# Patient Record
Sex: MALE | Race: WHITE | NOT HISPANIC OR LATINO | URBAN - METROPOLITAN AREA
[De-identification: names, ages, dates, MRNs, and addresses within clinical notes are randomized per-mention and may not be internally consistent; named-entity substitution may affect disease eponyms.]

---

## 2022-07-09 ENCOUNTER — TELEPHONE ENCOUNTER (OUTPATIENT)
Dept: URBAN - METROPOLITAN AREA CLINIC 121 | Facility: CLINIC | Age: 42
End: 2022-07-09

## 2022-07-09 RX ORDER — DEXTROAMPHETAMINE SACCHARATE, AMPHETAMINE ASPARTATE, DEXTROAMPHETAMINE SULFATE, AND AMPHETAMINE SULFATE 5; 5; 5; 5 MG/1; MG/1; MG/1; MG/1
TABLET ORAL ONCE A DAY
Refills: 0 | OUTPATIENT
Start: 2013-05-10 | End: 2013-06-07

## 2022-07-10 ENCOUNTER — TELEPHONE ENCOUNTER (OUTPATIENT)
Dept: URBAN - METROPOLITAN AREA CLINIC 121 | Facility: CLINIC | Age: 42
End: 2022-07-10

## 2022-07-10 RX ORDER — ALPRAZOLAM 0.25 MG
TABLET ORAL TAKE AS DIRECTED
Refills: 0 | Status: ACTIVE | COMMUNITY
Start: 2013-05-10

## 2022-07-10 RX ORDER — BISMUTH SUBSALICYLATE 525 MG/1
TABLET ORAL ONCE A DAY
Refills: 0 | Status: ACTIVE | COMMUNITY
Start: 2013-06-07

## 2022-08-14 LAB
ANION GAP SERPL CALCULATED.3IONS-SCNC: 28 MMOL/L (ref 10–20)
BICARBONATE: 15 MMOL/L (ref 21–32)
CHLORIDE BLD-SCNC: 98 MMOL/L (ref 98–107)
CREAT SERPL-MCNC: 2.3 MG/DL (ref 0.6–1.3)
GLUCOSE: 93 MG/DL (ref 74–99)
IONIZED CA: 0.9 MMOL/L (ref 1.1–1.3)
POTASSIUM SERPL-SCNC: 4.1 MMOL/L (ref 3.5–5.3)
SODIUM BLD-SCNC: 137 MMOL/L (ref 136–145)
UREA NITROGEN: 25 MG/DL (ref 6–23)

## 2024-03-17 ENCOUNTER — APPOINTMENT (OUTPATIENT)
Dept: RADIOLOGY | Facility: HOSPITAL | Age: 44
DRG: 894 | End: 2024-03-17
Payer: COMMERCIAL

## 2024-03-17 ENCOUNTER — HOSPITAL ENCOUNTER (INPATIENT)
Facility: HOSPITAL | Age: 44
LOS: 6 days | Discharge: LEFT BEFORE DISCHARGE | DRG: 894 | End: 2024-03-23
Attending: STUDENT IN AN ORGANIZED HEALTH CARE EDUCATION/TRAINING PROGRAM | Admitting: STUDENT IN AN ORGANIZED HEALTH CARE EDUCATION/TRAINING PROGRAM
Payer: COMMERCIAL

## 2024-03-17 ENCOUNTER — APPOINTMENT (OUTPATIENT)
Dept: CARDIOLOGY | Facility: HOSPITAL | Age: 44
DRG: 894 | End: 2024-03-17
Payer: COMMERCIAL

## 2024-03-17 DIAGNOSIS — F10.932 ALCOHOL WITHDRAWAL SYNDROME WITH PERCEPTUAL DISTURBANCE (MULTI): Primary | ICD-10-CM

## 2024-03-17 LAB
ALBUMIN SERPL BCP-MCNC: 4.3 G/DL (ref 3.4–5)
ALP SERPL-CCNC: 89 U/L (ref 33–120)
ALT SERPL W P-5'-P-CCNC: 420 U/L (ref 10–52)
ANION GAP SERPL CALC-SCNC: 27 MMOL/L (ref 10–20)
ANION GAP SERPL CALC-SCNC: 29 MMOL/L (ref 10–20)
APAP SERPL-MCNC: <10 UG/ML
APPARATUS: ABNORMAL
AST SERPL W P-5'-P-CCNC: 512 U/L (ref 9–39)
B-OH-BUTYR SERPL-SCNC: 0.96 MMOL/L (ref 0.02–0.27)
BASE EXCESS BLDV CALC-SCNC: -5.4 MMOL/L (ref -2–3)
BASOPHILS # BLD AUTO: 0.05 X10*3/UL (ref 0–0.1)
BASOPHILS NFR BLD AUTO: 0.8 %
BILIRUB SERPL-MCNC: 0.9 MG/DL (ref 0–1.2)
BODY TEMPERATURE: 37 DEGREES CELSIUS
BUN SERPL-MCNC: 17 MG/DL (ref 6–23)
BUN SERPL-MCNC: 18 MG/DL (ref 6–23)
CALCIUM SERPL-MCNC: 7.6 MG/DL (ref 8.6–10.3)
CALCIUM SERPL-MCNC: 8.1 MG/DL (ref 8.6–10.3)
CHLORIDE SERPL-SCNC: 95 MMOL/L (ref 98–107)
CHLORIDE SERPL-SCNC: 96 MMOL/L (ref 98–107)
CO2 SERPL-SCNC: 15 MMOL/L (ref 21–32)
CO2 SERPL-SCNC: 15 MMOL/L (ref 21–32)
CREAT SERPL-MCNC: 0.71 MG/DL (ref 0.5–1.3)
CREAT SERPL-MCNC: 0.81 MG/DL (ref 0.5–1.3)
EGFRCR SERPLBLD CKD-EPI 2021: >90 ML/MIN/1.73M*2
EGFRCR SERPLBLD CKD-EPI 2021: >90 ML/MIN/1.73M*2
EOSINOPHIL # BLD AUTO: 0.02 X10*3/UL (ref 0–0.7)
EOSINOPHIL NFR BLD AUTO: 0.3 %
ERYTHROCYTE [DISTWIDTH] IN BLOOD BY AUTOMATED COUNT: 13.4 % (ref 11.5–14.5)
ETHANOL SERPL-MCNC: 260 MG/DL
FLUAV RNA RESP QL NAA+PROBE: NOT DETECTED
FLUBV RNA RESP QL NAA+PROBE: NOT DETECTED
FREQUENCY (BPM): 18 BPM
GLUCOSE BLD MANUAL STRIP-MCNC: 97 MG/DL (ref 74–99)
GLUCOSE SERPL-MCNC: 68 MG/DL (ref 74–99)
GLUCOSE SERPL-MCNC: 70 MG/DL (ref 74–99)
HCO3 BLDV-SCNC: 19.2 MMOL/L (ref 22–26)
HCT VFR BLD AUTO: 46.9 % (ref 41–52)
HGB BLD-MCNC: 16.3 G/DL (ref 13.5–17.5)
HOLD SPECIMEN: NORMAL
IMM GRANULOCYTES # BLD AUTO: 0.05 X10*3/UL (ref 0–0.7)
IMM GRANULOCYTES NFR BLD AUTO: 0.8 % (ref 0–0.9)
INHALED O2 CONCENTRATION: 28 %
LIPASE SERPL-CCNC: 47 U/L (ref 9–82)
LYMPHOCYTES # BLD AUTO: 1.53 X10*3/UL (ref 1.2–4.8)
LYMPHOCYTES NFR BLD AUTO: 23.6 %
MAGNESIUM SERPL-MCNC: 1.64 MG/DL (ref 1.6–2.4)
MCH RBC QN AUTO: 35.5 PG (ref 26–34)
MCHC RBC AUTO-ENTMCNC: 34.8 G/DL (ref 32–36)
MCV RBC AUTO: 102 FL (ref 80–100)
MONOCYTES # BLD AUTO: 0.41 X10*3/UL (ref 0.1–1)
MONOCYTES NFR BLD AUTO: 6.3 %
NEUTROPHILS # BLD AUTO: 4.42 X10*3/UL (ref 1.2–7.7)
NEUTROPHILS NFR BLD AUTO: 68.2 %
NRBC BLD-RTO: 0 /100 WBCS (ref 0–0)
OXYHGB MFR BLDV: 81.9 % (ref 45–75)
PCO2 BLDV: 34 MM HG (ref 41–51)
PH BLDV: 7.36 PH (ref 7.33–7.43)
PHOSPHATE SERPL-MCNC: 3.1 MG/DL (ref 2.5–4.9)
PLATELET # BLD AUTO: 236 X10*3/UL (ref 150–450)
PO2 BLDV: 53 MM HG (ref 35–45)
POTASSIUM SERPL-SCNC: 4 MMOL/L (ref 3.5–5.3)
POTASSIUM SERPL-SCNC: 4.3 MMOL/L (ref 3.5–5.3)
POTASSIUM SERPL-SCNC: 7.5 MMOL/L (ref 3.5–5.3)
PROT SERPL-MCNC: 7 G/DL (ref 6.4–8.2)
RBC # BLD AUTO: 4.59 X10*6/UL (ref 4.5–5.9)
SALICYLATES SERPL-MCNC: <3 MG/DL
SAO2 % BLDV: 85 % (ref 45–75)
SARS-COV-2 RNA RESP QL NAA+PROBE: NOT DETECTED
SODIUM SERPL-SCNC: 131 MMOL/L (ref 136–145)
SODIUM SERPL-SCNC: 134 MMOL/L (ref 136–145)
WBC # BLD AUTO: 6.5 X10*3/UL (ref 4.4–11.3)

## 2024-03-17 PROCEDURE — 84132 ASSAY OF SERUM POTASSIUM: CPT | Performed by: STUDENT IN AN ORGANIZED HEALTH CARE EDUCATION/TRAINING PROGRAM

## 2024-03-17 PROCEDURE — 99291 CRITICAL CARE FIRST HOUR: CPT | Mod: 25 | Performed by: STUDENT IN AN ORGANIZED HEALTH CARE EDUCATION/TRAINING PROGRAM

## 2024-03-17 PROCEDURE — 96374 THER/PROPH/DIAG INJ IV PUSH: CPT

## 2024-03-17 PROCEDURE — 80307 DRUG TEST PRSMV CHEM ANLYZR: CPT

## 2024-03-17 PROCEDURE — 82010 KETONE BODYS QUAN: CPT

## 2024-03-17 PROCEDURE — 85025 COMPLETE CBC W/AUTO DIFF WBC: CPT | Performed by: STUDENT IN AN ORGANIZED HEALTH CARE EDUCATION/TRAINING PROGRAM

## 2024-03-17 PROCEDURE — 2550000001 HC RX 255 CONTRASTS: Performed by: STUDENT IN AN ORGANIZED HEALTH CARE EDUCATION/TRAINING PROGRAM

## 2024-03-17 PROCEDURE — 2500000004 HC RX 250 GENERAL PHARMACY W/ HCPCS (ALT 636 FOR OP/ED): Performed by: STUDENT IN AN ORGANIZED HEALTH CARE EDUCATION/TRAINING PROGRAM

## 2024-03-17 PROCEDURE — 83605 ASSAY OF LACTIC ACID: CPT | Performed by: STUDENT IN AN ORGANIZED HEALTH CARE EDUCATION/TRAINING PROGRAM

## 2024-03-17 PROCEDURE — 84100 ASSAY OF PHOSPHORUS: CPT | Performed by: STUDENT IN AN ORGANIZED HEALTH CARE EDUCATION/TRAINING PROGRAM

## 2024-03-17 PROCEDURE — 36415 COLL VENOUS BLD VENIPUNCTURE: CPT | Performed by: STUDENT IN AN ORGANIZED HEALTH CARE EDUCATION/TRAINING PROGRAM

## 2024-03-17 PROCEDURE — 93005 ELECTROCARDIOGRAM TRACING: CPT

## 2024-03-17 PROCEDURE — 87636 SARSCOV2 & INF A&B AMP PRB: CPT | Performed by: STUDENT IN AN ORGANIZED HEALTH CARE EDUCATION/TRAINING PROGRAM

## 2024-03-17 PROCEDURE — 70450 CT HEAD/BRAIN W/O DYE: CPT

## 2024-03-17 PROCEDURE — 2500000004 HC RX 250 GENERAL PHARMACY W/ HCPCS (ALT 636 FOR OP/ED)

## 2024-03-17 PROCEDURE — 96376 TX/PRO/DX INJ SAME DRUG ADON: CPT

## 2024-03-17 PROCEDURE — 74177 CT ABD & PELVIS W/CONTRAST: CPT

## 2024-03-17 PROCEDURE — 74177 CT ABD & PELVIS W/CONTRAST: CPT | Mod: FOREIGN READ | Performed by: RADIOLOGY

## 2024-03-17 PROCEDURE — 82947 ASSAY GLUCOSE BLOOD QUANT: CPT

## 2024-03-17 PROCEDURE — 80143 DRUG ASSAY ACETAMINOPHEN: CPT | Performed by: STUDENT IN AN ORGANIZED HEALTH CARE EDUCATION/TRAINING PROGRAM

## 2024-03-17 PROCEDURE — 80053 COMPREHEN METABOLIC PANEL: CPT | Performed by: STUDENT IN AN ORGANIZED HEALTH CARE EDUCATION/TRAINING PROGRAM

## 2024-03-17 PROCEDURE — 96375 TX/PRO/DX INJ NEW DRUG ADDON: CPT

## 2024-03-17 PROCEDURE — 83690 ASSAY OF LIPASE: CPT | Performed by: STUDENT IN AN ORGANIZED HEALTH CARE EDUCATION/TRAINING PROGRAM

## 2024-03-17 PROCEDURE — 82805 BLOOD GASES W/O2 SATURATION: CPT | Performed by: STUDENT IN AN ORGANIZED HEALTH CARE EDUCATION/TRAINING PROGRAM

## 2024-03-17 PROCEDURE — 70450 CT HEAD/BRAIN W/O DYE: CPT | Performed by: RADIOLOGY

## 2024-03-17 PROCEDURE — 96361 HYDRATE IV INFUSION ADD-ON: CPT

## 2024-03-17 PROCEDURE — 80320 DRUG SCREEN QUANTALCOHOLS: CPT | Performed by: STUDENT IN AN ORGANIZED HEALTH CARE EDUCATION/TRAINING PROGRAM

## 2024-03-17 PROCEDURE — 83735 ASSAY OF MAGNESIUM: CPT | Performed by: STUDENT IN AN ORGANIZED HEALTH CARE EDUCATION/TRAINING PROGRAM

## 2024-03-17 PROCEDURE — 99291 CRITICAL CARE FIRST HOUR: CPT

## 2024-03-17 PROCEDURE — 2020000001 HC ICU ROOM DAILY

## 2024-03-17 RX ORDER — ALLOPURINOL 300 MG/1
300 TABLET ORAL DAILY
COMMUNITY
Start: 2018-05-24

## 2024-03-17 RX ORDER — PHENOBARBITAL SODIUM 65 MG/ML
260 INJECTION, SOLUTION INTRAMUSCULAR; INTRAVENOUS ONCE
Status: COMPLETED | OUTPATIENT
Start: 2024-03-17 | End: 2024-03-17

## 2024-03-17 RX ORDER — LORAZEPAM 2 MG/ML
6 INJECTION INTRAMUSCULAR ONCE
Status: COMPLETED | OUTPATIENT
Start: 2024-03-17 | End: 2024-03-17

## 2024-03-17 RX ORDER — PANTOPRAZOLE SODIUM 40 MG/10ML
40 INJECTION, POWDER, LYOPHILIZED, FOR SOLUTION INTRAVENOUS DAILY
Status: DISCONTINUED | OUTPATIENT
Start: 2024-03-18 | End: 2024-03-17

## 2024-03-17 RX ORDER — QUETIAPINE FUMARATE 100 MG/1
100 TABLET, FILM COATED ORAL NIGHTLY PRN
COMMUNITY
Start: 2018-05-24

## 2024-03-17 RX ORDER — ENOXAPARIN SODIUM 100 MG/ML
40 INJECTION SUBCUTANEOUS EVERY 24 HOURS
Status: DISCONTINUED | OUTPATIENT
Start: 2024-03-17 | End: 2024-03-23 | Stop reason: HOSPADM

## 2024-03-17 RX ORDER — ROPINIROLE 4 MG/1
4 TABLET, FILM COATED ORAL NIGHTLY PRN
COMMUNITY
Start: 2024-02-26

## 2024-03-17 RX ORDER — ALLOPURINOL 100 MG/1
300 TABLET ORAL DAILY
Status: DISCONTINUED | OUTPATIENT
Start: 2024-03-17 | End: 2024-03-23 | Stop reason: HOSPADM

## 2024-03-17 RX ORDER — PANTOPRAZOLE SODIUM 40 MG/1
40 TABLET, DELAYED RELEASE ORAL
Status: DISCONTINUED | OUTPATIENT
Start: 2024-03-18 | End: 2024-03-17

## 2024-03-17 RX ORDER — METOPROLOL TARTRATE 50 MG/1
50 TABLET ORAL 2 TIMES DAILY
Status: DISCONTINUED | OUTPATIENT
Start: 2024-03-17 | End: 2024-03-17

## 2024-03-17 RX ORDER — DIAZEPAM 5 MG/ML
10 INJECTION, SOLUTION INTRAMUSCULAR; INTRAVENOUS EVERY 2 HOUR PRN
Status: DISCONTINUED | OUTPATIENT
Start: 2024-03-17 | End: 2024-03-19

## 2024-03-17 RX ORDER — ESOMEPRAZOLE MAGNESIUM 40 MG/1
40 GRANULE, DELAYED RELEASE ORAL
Status: DISCONTINUED | OUTPATIENT
Start: 2024-03-18 | End: 2024-03-17

## 2024-03-17 RX ORDER — FOLIC ACID 1 MG/1
1 TABLET ORAL DAILY
Status: DISCONTINUED | OUTPATIENT
Start: 2024-03-17 | End: 2024-03-23 | Stop reason: HOSPADM

## 2024-03-17 RX ORDER — METOPROLOL TARTRATE 1 MG/ML
5 INJECTION, SOLUTION INTRAVENOUS EVERY 8 HOURS SCHEDULED
Status: DISCONTINUED | OUTPATIENT
Start: 2024-03-17 | End: 2024-03-17

## 2024-03-17 RX ORDER — QUETIAPINE FUMARATE 100 MG/1
100 TABLET, FILM COATED ORAL NIGHTLY PRN
Status: DISCONTINUED | OUTPATIENT
Start: 2024-03-17 | End: 2024-03-17

## 2024-03-17 RX ORDER — MULTIVIT-MIN/IRON FUM/FOLIC AC 7.5 MG-4
1 TABLET ORAL DAILY
Status: DISCONTINUED | OUTPATIENT
Start: 2024-03-17 | End: 2024-03-23 | Stop reason: HOSPADM

## 2024-03-17 RX ORDER — LANOLIN ALCOHOL/MO/W.PET/CERES
100 CREAM (GRAM) TOPICAL DAILY
Status: DISCONTINUED | OUTPATIENT
Start: 2024-03-20 | End: 2024-03-23 | Stop reason: HOSPADM

## 2024-03-17 RX ORDER — ONDANSETRON HYDROCHLORIDE 2 MG/ML
4 INJECTION, SOLUTION INTRAVENOUS ONCE
Status: COMPLETED | OUTPATIENT
Start: 2024-03-17 | End: 2024-03-17

## 2024-03-17 RX ORDER — LORAZEPAM 2 MG/ML
2 INJECTION INTRAMUSCULAR ONCE
Status: DISCONTINUED | OUTPATIENT
Start: 2024-03-17 | End: 2024-03-17

## 2024-03-17 RX ORDER — PANTOPRAZOLE SODIUM 40 MG/10ML
40 INJECTION, POWDER, LYOPHILIZED, FOR SOLUTION INTRAVENOUS DAILY
Status: DISCONTINUED | OUTPATIENT
Start: 2024-03-18 | End: 2024-03-23 | Stop reason: HOSPADM

## 2024-03-17 RX ORDER — COLCHICINE 0.6 MG/1
0.6 TABLET ORAL 2 TIMES DAILY PRN
Status: DISCONTINUED | OUTPATIENT
Start: 2024-03-17 | End: 2024-03-17

## 2024-03-17 RX ORDER — DEXMEDETOMIDINE HYDROCHLORIDE 4 UG/ML
.1-1.5 INJECTION, SOLUTION INTRAVENOUS CONTINUOUS
Status: DISCONTINUED | OUTPATIENT
Start: 2024-03-17 | End: 2024-03-20

## 2024-03-17 RX ORDER — TALC
1 POWDER (GRAM) TOPICAL NIGHTLY
COMMUNITY
Start: 2018-06-08

## 2024-03-17 RX ORDER — LORAZEPAM 2 MG/ML
4 INJECTION INTRAMUSCULAR ONCE
Status: COMPLETED | OUTPATIENT
Start: 2024-03-17 | End: 2024-03-17

## 2024-03-17 RX ORDER — LORAZEPAM 2 MG/ML
2 INJECTION INTRAMUSCULAR ONCE
Status: COMPLETED | OUTPATIENT
Start: 2024-03-17 | End: 2024-03-17

## 2024-03-17 RX ORDER — ROPINIROLE 2 MG/1
4 TABLET, FILM COATED ORAL NIGHTLY PRN
Status: DISCONTINUED | OUTPATIENT
Start: 2024-03-17 | End: 2024-03-17

## 2024-03-17 RX ORDER — METOPROLOL TARTRATE 1 MG/ML
5 INJECTION, SOLUTION INTRAVENOUS EVERY 6 HOURS PRN
Status: DISCONTINUED | OUTPATIENT
Start: 2024-03-17 | End: 2024-03-18

## 2024-03-17 RX ORDER — PHENOBARBITAL SODIUM 65 MG/ML
32.5 INJECTION, SOLUTION INTRAMUSCULAR; INTRAVENOUS EVERY 8 HOURS
Status: COMPLETED | OUTPATIENT
Start: 2024-03-21 | End: 2024-03-23

## 2024-03-17 RX ORDER — METOPROLOL TARTRATE 50 MG/1
50 TABLET ORAL 2 TIMES DAILY
COMMUNITY
Start: 2019-11-13

## 2024-03-17 RX ORDER — PHENOBARBITAL SODIUM 65 MG/ML
65 INJECTION, SOLUTION INTRAMUSCULAR; INTRAVENOUS EVERY 8 HOURS
Status: COMPLETED | OUTPATIENT
Start: 2024-03-19 | End: 2024-03-21

## 2024-03-17 RX ORDER — PHENOBARBITAL SODIUM 65 MG/ML
97.5 INJECTION, SOLUTION INTRAMUSCULAR; INTRAVENOUS EVERY 8 HOURS
Status: COMPLETED | OUTPATIENT
Start: 2024-03-17 | End: 2024-03-19

## 2024-03-17 RX ORDER — PANTOPRAZOLE SODIUM 40 MG/10ML
40 INJECTION, POWDER, LYOPHILIZED, FOR SOLUTION INTRAVENOUS
Status: DISCONTINUED | OUTPATIENT
Start: 2024-03-18 | End: 2024-03-17

## 2024-03-17 RX ORDER — COLCHICINE 0.6 MG/1
0.6 TABLET ORAL 2 TIMES DAILY PRN
COMMUNITY
Start: 2023-05-01

## 2024-03-17 RX ORDER — DEXTROSE MONOHYDRATE AND SODIUM CHLORIDE 5; .9 G/100ML; G/100ML
125 INJECTION, SOLUTION INTRAVENOUS CONTINUOUS
Status: DISCONTINUED | OUTPATIENT
Start: 2024-03-17 | End: 2024-03-19

## 2024-03-17 RX ORDER — THIAMINE HYDROCHLORIDE 100 MG/ML
100 INJECTION, SOLUTION INTRAMUSCULAR; INTRAVENOUS DAILY
Status: DISCONTINUED | OUTPATIENT
Start: 2024-03-17 | End: 2024-03-17

## 2024-03-17 RX ADMIN — LORAZEPAM 4 MG: 2 INJECTION, SOLUTION INTRAMUSCULAR; INTRAVENOUS at 11:00

## 2024-03-17 RX ADMIN — PHENOBARBITAL SODIUM 97.5 MG: 65 INJECTION INTRAMUSCULAR; INTRAVENOUS at 23:50

## 2024-03-17 RX ADMIN — DEXMEDETOMIDINE HYDROCHLORIDE 0.4 MCG/KG/HR: 4 INJECTION, SOLUTION INTRAVENOUS at 20:33

## 2024-03-17 RX ADMIN — DIAZEPAM 10 MG: 10 INJECTION, SOLUTION INTRAMUSCULAR; INTRAVENOUS at 09:35

## 2024-03-17 RX ADMIN — DEXMEDETOMIDINE HYDROCHLORIDE 0.4 MCG/KG/HR: 4 INJECTION, SOLUTION INTRAVENOUS at 17:10

## 2024-03-17 RX ADMIN — DIAZEPAM 10 MG: 10 INJECTION, SOLUTION INTRAMUSCULAR; INTRAVENOUS at 16:30

## 2024-03-17 RX ADMIN — THIAMINE HYDROCHLORIDE 100 MG: 100 INJECTION, SOLUTION INTRAMUSCULAR; INTRAVENOUS at 10:06

## 2024-03-17 RX ADMIN — LORAZEPAM 2 MG: 2 INJECTION INTRAMUSCULAR; INTRAVENOUS at 09:44

## 2024-03-17 RX ADMIN — ONDANSETRON 4 MG: 2 INJECTION INTRAMUSCULAR; INTRAVENOUS at 09:29

## 2024-03-17 RX ADMIN — DEXTROSE AND SODIUM CHLORIDE 75 ML/HR: 5; 900 INJECTION, SOLUTION INTRAVENOUS at 14:59

## 2024-03-17 RX ADMIN — DIAZEPAM 10 MG: 10 INJECTION, SOLUTION INTRAMUSCULAR; INTRAVENOUS at 13:36

## 2024-03-17 RX ADMIN — THIAMINE HYDROCHLORIDE 500 MG: 100 INJECTION, SOLUTION INTRAMUSCULAR; INTRAVENOUS at 20:43

## 2024-03-17 RX ADMIN — PHENOBARBITAL SODIUM 97.5 MG: 65 INJECTION INTRAMUSCULAR; INTRAVENOUS at 17:17

## 2024-03-17 RX ADMIN — DEXMEDETOMIDINE HYDROCHLORIDE 0.2 MCG/KG/HR: 4 INJECTION, SOLUTION INTRAVENOUS at 13:35

## 2024-03-17 RX ADMIN — DIAZEPAM 10 MG: 10 INJECTION, SOLUTION INTRAMUSCULAR; INTRAVENOUS at 20:43

## 2024-03-17 RX ADMIN — SODIUM CHLORIDE, POTASSIUM CHLORIDE, SODIUM LACTATE AND CALCIUM CHLORIDE 1000 ML: 600; 310; 30; 20 INJECTION, SOLUTION INTRAVENOUS at 12:53

## 2024-03-17 RX ADMIN — LORAZEPAM 6 MG: 2 INJECTION, SOLUTION INTRAMUSCULAR; INTRAVENOUS at 11:29

## 2024-03-17 RX ADMIN — PHENOBARBITAL SODIUM 260 MG: 65 INJECTION INTRAMUSCULAR; INTRAVENOUS at 09:29

## 2024-03-17 RX ADMIN — ENOXAPARIN SODIUM 40 MG: 40 INJECTION SUBCUTANEOUS at 16:30

## 2024-03-17 RX ADMIN — IOHEXOL 75 ML: 350 INJECTION, SOLUTION INTRAVENOUS at 12:40

## 2024-03-17 SDOH — HEALTH STABILITY: MENTAL HEALTH: BEHAVIORS/MOOD: AGITATED;ANXIOUS;ANGRY;APPEARS INTOXICATED

## 2024-03-17 ASSESSMENT — LIFESTYLE VARIABLES
VISUAL DISTURBANCES: SEVERE HALLUCINATIONS
PULSE: 117
ANXIETY: NO ANXIETY, AT EASE
TACTILE DISTURBANCES: MODERATELY SEVERE HALLUCINATIONS
ORIENTATION AND CLOUDING OF SENSORIUM: CANNOT DO SERIAL ADDITIONS OR IS UNCERTAIN ABOUT DATE
EVER HAD A DRINK FIRST THING IN THE MORNING TO STEADY YOUR NERVES TO GET RID OF A HANGOVER: YES
HEADACHE, FULLNESS IN HEAD: SEVERE
AUDITORY DISTURBANCES: NOT PRESENT
AGITATION: NORMAL ACTIVITY
HEADACHE, FULLNESS IN HEAD: MODERATE
ORIENTATION AND CLOUDING OF SENSORIUM: CANNOT DO SERIAL ADDITIONS OR IS UNCERTAIN ABOUT DATE
AUDITORY DISTURBANCES: SEVERE HALLUCINATIONS
EVER FELT BAD OR GUILTY ABOUT YOUR DRINKING: YES
VISUAL DISTURBANCES: SEVERE HALLUCINATIONS
AUDITORY DISTURBANCES: SEVERE HALLUCINATIONS
ANXIETY: EQUIVALENT TO ACUTE PANIC STATES AS SEEN IN SEVERE DELIRIUM OR ACUTE SCHIZOPHRENIC REACTIONS
AUDITORY DISTURBANCES: VERY MILD HARSHNESS OR ABILITY TO FRIGHTEN
PAROXYSMAL SWEATS: BEADS OF SWEAT OBVIOUS ON FOREHEAD
NAUSEA AND VOMITING: MILD NAUSEA WITH NO VOMITING
AGITATION: 5
ORIENTATION AND CLOUDING OF SENSORIUM: CANNOT DO SERIAL ADDITIONS OR IS UNCERTAIN ABOUT DATE
ANXIETY: 5
PULSE: 134
TOTAL SCORE: 41
AUDITORY DISTURBANCES: SEVERE HALLUCINATIONS
PULSE: 91
PAROXYSMAL SWEATS: 3
VISUAL DISTURBANCES: NOT PRESENT
AGITATION: 6
VISUAL DISTURBANCES: SEVERE HALLUCINATIONS
TACTILE DISTURBANCES: SEVERE HALLUCINATIONS
AGITATION: PACES BACK AND FORTH DURING MOST OF THE INTERVIEW, OR CONSTANTLY THRASHES ABOUT
NAUSEA AND VOMITING: NO NAUSEA AND NO VOMITING
VISUAL DISTURBANCES: SEVERE HALLUCINATIONS
BLOOD PRESSURE: 144/84
TOTAL SCORE: 51
TOTAL SCORE: 31
VISUAL DISTURBANCES: NOT PRESENT
PAROXYSMAL SWEATS: 3
TOTAL SCORE: 25
BLOOD PRESSURE: 139/87
HEADACHE, FULLNESS IN HEAD: SEVERE
AUDITORY DISTURBANCES: SEVERE HALLUCINATIONS
HEADACHE, FULLNESS IN HEAD: VERY MILD
TACTILE DISTURBANCES: SEVERE HALLUCINATIONS
AGITATION: NORMAL ACTIVITY
NAUSEA AND VOMITING: 2
HEADACHE, FULLNESS IN HEAD: SEVERE
TOTAL SCORE: 45
NAUSEA AND VOMITING: MILD NAUSEA WITH NO VOMITING
ORIENTATION AND CLOUDING OF SENSORIUM: CANNOT DO SERIAL ADDITIONS OR IS UNCERTAIN ABOUT DATE
TACTILE DISTURBANCES: SEVERE HALLUCINATIONS
TREMOR: MODERATE, WITH PATIENT'S ARMS EXTENDED
TOTAL SCORE: 7
VISUAL DISTURBANCES: SEVERE HALLUCINATIONS
NAUSEA AND VOMITING: NO NAUSEA AND NO VOMITING
HEADACHE, FULLNESS IN HEAD: NOT PRESENT
AGITATION: NORMAL ACTIVITY
AGITATION: 6
NAUSEA AND VOMITING: 2
BLOOD PRESSURE: 115/70
PULSE: 125
NAUSEA AND VOMITING: MILD NAUSEA WITH NO VOMITING
TREMOR: NO TREMOR
AGITATION: NORMAL ACTIVITY
NAUSEA AND VOMITING: NO NAUSEA AND NO VOMITING
PULSE: 90
AUDITORY DISTURBANCES: NOT PRESENT
ANXIETY: NO ANXIETY, AT EASE
NAUSEA AND VOMITING: NO NAUSEA AND NO VOMITING
ORIENTATION AND CLOUDING OF SENSORIUM: CANNOT DO SERIAL ADDITIONS OR IS UNCERTAIN ABOUT DATE
ORIENTATION AND CLOUDING OF SENSORIUM: CANNOT DO SERIAL ADDITIONS OR IS UNCERTAIN ABOUT DATE
HAVE PEOPLE ANNOYED YOU BY CRITICIZING YOUR DRINKING: YES
ANXIETY: 2
TREMOR: 3
BLOOD PRESSURE: 117/72
PAROXYSMAL SWEATS: 3
PAROXYSMAL SWEATS: 3
VISUAL DISTURBANCES: MILD SENSITIVITY
PAROXYSMAL SWEATS: BARELY PERCEPTIBLE SWEATING, PALMS MOIST
PAROXYSMAL SWEATS: BARELY PERCEPTIBLE SWEATING, PALMS MOIST
HEADACHE, FULLNESS IN HEAD: SEVERE
HAVE YOU EVER FELT YOU SHOULD CUT DOWN ON YOUR DRINKING: YES
TOTAL SCORE: 4
HEADACHE, FULLNESS IN HEAD: SEVERE
ANXIETY: EQUIVALENT TO ACUTE PANIC STATES AS SEEN IN SEVERE DELIRIUM OR ACUTE SCHIZOPHRENIC REACTIONS
AGITATION: MODERATELY FIDGETY AND RESTLESS
PAROXYSMAL SWEATS: NO SWEAT VISIBLE
BLOOD PRESSURE: 117/72
ANXIETY: NO ANXIETY, AT EASE
TREMOR: 2
TREMOR: SEVERE, EVEN WITH ARMS NOT EXTENDED
TOTAL SCORE: 40
TREMOR: NO TREMOR
AGITATION: 5
PAROXYSMAL SWEATS: BARELY PERCEPTIBLE SWEATING, PALMS MOIST
TOTAL SCORE: 37
VISUAL DISTURBANCES: SEVERE HALLUCINATIONS
ANXIETY: MODERATELY ANXIOUS, OR GUARDED, SO ANXIETY IS INFERRED
TREMOR: 3
VISUAL DISTURBANCES: SEVERE HALLUCINATIONS
TACTILE DISTURBANCES: SEVERE HALLUCINATIONS
TOTAL SCORE: 7
ORIENTATION AND CLOUDING OF SENSORIUM: CANNOT DO SERIAL ADDITIONS OR IS UNCERTAIN ABOUT DATE
TREMOR: 3
HEADACHE, FULLNESS IN HEAD: MODERATELY SEVERE
TREMOR: SEVERE, EVEN WITH ARMS NOT EXTENDED
PAROXYSMAL SWEATS: NO SWEAT VISIBLE
AUDITORY DISTURBANCES: SEVERE HALLUCINATIONS
PULSE: 108
AUDITORY DISTURBANCES: SEVERE HALLUCINATIONS
TACTILE DISTURBANCES: SEVERE HALLUCINATIONS
TREMOR: NOT VISIBLE, BUT CAN BE FELT FINGERTIP TO FINGERTIP
ORIENTATION AND CLOUDING OF SENSORIUM: CANNOT DO SERIAL ADDITIONS OR IS UNCERTAIN ABOUT DATE
NAUSEA AND VOMITING: INTERMITTENT NAUSEA WITH DRY HEAVES
ANXIETY: 6
ANXIETY: 2
HEADACHE, FULLNESS IN HEAD: VERY SEVERE
ORIENTATION AND CLOUDING OF SENSORIUM: DISORIENTED FOR PLACE OR PERSON
AUDITORY DISTURBANCES: SEVERE HALLUCINATIONS
ORIENTATION AND CLOUDING OF SENSORIUM: DISORIENTED FOR PLACE OR PERSON
TACTILE DISTURBANCES: SEVERE HALLUCINATIONS

## 2024-03-17 ASSESSMENT — COGNITIVE AND FUNCTIONAL STATUS - GENERAL
TURNING FROM BACK TO SIDE WHILE IN FLAT BAD: A LOT
DRESSING REGULAR LOWER BODY CLOTHING: A LOT
DAILY ACTIVITIY SCORE: 12
WALKING IN HOSPITAL ROOM: TOTAL
HELP NEEDED FOR BATHING: A LOT
CLIMB 3 TO 5 STEPS WITH RAILING: TOTAL
TOILETING: A LOT
PERSONAL GROOMING: A LOT
MOVING FROM LYING ON BACK TO SITTING ON SIDE OF FLAT BED WITH BEDRAILS: A LOT
STANDING UP FROM CHAIR USING ARMS: A LOT
MOBILITY SCORE: 10
DRESSING REGULAR UPPER BODY CLOTHING: A LOT
MOVING TO AND FROM BED TO CHAIR: A LOT
EATING MEALS: A LOT

## 2024-03-17 ASSESSMENT — PAIN SCALES - GENERAL
PAINLEVEL_OUTOF10: 0 - NO PAIN
PAINLEVEL_OUTOF10: 6
PAINLEVEL_OUTOF10: 6

## 2024-03-17 ASSESSMENT — ABNORMAL INVOLUNTARY MOVEMENT SCALE (AIMS)
CURRENT_PROBLEMS_TEETH_DENTURES: NO
PATIENT_WEARS_DENTURES: NO

## 2024-03-17 ASSESSMENT — PAIN - FUNCTIONAL ASSESSMENT
PAIN_FUNCTIONAL_ASSESSMENT: 0-10

## 2024-03-17 ASSESSMENT — COLUMBIA-SUICIDE SEVERITY RATING SCALE - C-SSRS
6. HAVE YOU EVER DONE ANYTHING, STARTED TO DO ANYTHING, OR PREPARED TO DO ANYTHING TO END YOUR LIFE?: NO
2. HAVE YOU ACTUALLY HAD ANY THOUGHTS OF KILLING YOURSELF?: NO
1. IN THE PAST MONTH, HAVE YOU WISHED YOU WERE DEAD OR WISHED YOU COULD GO TO SLEEP AND NOT WAKE UP?: NO

## 2024-03-17 ASSESSMENT — PAIN DESCRIPTION - LOCATION: LOCATION: ABDOMEN

## 2024-03-17 NOTE — SIGNIFICANT EVENT
ICU Attending Summary:    Mr. Villalobos is a 43yo male with history of EtOH abuse with prior DTs without seizure treated with phenobarb taper, prior Margoth Deluca tear, prior afib on metoprolol, gout on PRN colchicine presenting with EtOH withdrawal and abdominal pain with CT showing enterocolitis. Due to severity of withdrawal, he is admitted to ICU for further management.    #Acute alcohol intoxication and DTs: EtOH 260 and CIWA score 51 on presentation. Received 20mg valium, 10mg ativan and was started on precedex ggt. Pt had refused phenobarb when agitated, however has tolerated in the past. Will initiate phenobarb taper and wean Precedex. Initiate thiamine 500mg tid for three days and 100mg thereafter. NPO due to mentation    #History of afib: hold PO metop while NPO, initiate prn IV metoprolol for HR >110     #Acute enterocolitis: benign abdominal exam, supportive care    #Transaminitis: likely alcoholic hepatitis, CT shows hepatic steatosis. Trend LFTs     #HAGMA: likely alcoholic ketosis vs starvation ketosis. Continue thiamine and D5LR, repeat CMP with morning labs      Lines/tubes: PIVs  Prophylaxis: Lovenox, PPI  Drips: Precedex  Fluids: PRN  Oxygen: NC  Nutrition: NPO  Restraints: bilateral wrist restraints due to pulling at lines/tubes. Will reassess regularly  Code status: presumed full code, follow up with NOK  Dispo: ICU

## 2024-03-17 NOTE — CARE PLAN
The clinical goals for the shift include Pt will remain hemodynamically stable, co level will remain wnl and poxpt vs remainsed stabvle upon admission to unit and he was free from injury

## 2024-03-17 NOTE — ED PROVIDER NOTES
HPI   Chief Complaint   Patient presents with    Alcohol Intoxication       This is a 44-year-old male with past medical history of hypertension presenting the emergency department with concern for alcohol withdrawal.  Patient endorses daily drinking.  Last drink was yesterday evening.  States he has had withdrawals in the past requiring hospitalization.  Denies any seizure history.  States symptoms are similar to his withdrawals other than new right-sided abdominal pain.  States the pain is sharp in nature and started overnight.  Endorses some nausea but no vomiting.  Endorses generalized bodyaches, chills.  Denies chest pain, shortness of breath, vision changes, urinary symptoms.  Denies constipation or diarrhea.      History provided by:  Patient   used: No                        [place] Coma Scale Score: 14                     Patient History   Past Medical History:   Diagnosis Date    Personal history of other diseases of the circulatory system 08/27/2021    History of hypertension    Personal history of other diseases of the musculoskeletal system and connective tissue 08/27/2021    Personal history of gout     No past surgical history on file.  No family history on file.  Social History     Tobacco Use    Smoking status: Not on file    Smokeless tobacco: Not on file   Substance Use Topics    Alcohol use: Not on file    Drug use: Not on file       Physical Exam   ED Triage Vitals [03/17/24 0925]   Temperature Heart Rate Respirations BP   36.9 °C (98.4 °F) (!) 123 16 (!) 126/97      Pulse Ox Temp Source Heart Rate Source Patient Position   100 % Tympanic Monitor --      BP Location FiO2 (%)     Right arm --       Physical Exam  GEN: Anxious, tremulous appearing, agitated  HEAD: atraumatic  CVS/CHEST: Tachycardic rate, nl rhythm, no murmurs/gallops/rubs  PULM: CTAB b/l no wheezes, crackles, or rhonchi   GI: RUQ ttp, ND, no masses or organomegaly, soft, no guarding  BACK: no CVA  tenderness  NEURO: no focal deficits, no facial asymmetry, moving all extremities, tremors at rest  PSYCH: AAOx2 (person, place) pressured speech  ED Course & MDM   ED Course as of 03/17/24 1628   Sun Mar 17, 2024   0944 Phenobarbital initially ordered.  When attempting to administer patient became increasingly agitated yelling he does not like the way phenobarbital makes him feel as he has received in the past.  We will hold on phenobarbital at this time.  Patient given additional dose of benzodiazepines given his markedly elevated CIWA score. [DE]   1441 Patient requiring increasing doses of benzodiazepines for his continued agitation/withdrawal.  Was eventually started on Precedex with improvement.  Lab work significant for elevated potassium though with hemolysis.  Repeat within normal limits.  He does have elevated LFTs.  He has decreased bicarb and calculated anion gap of 21.  This is likely alcoholic/starvation ketoacidosis.  Patient given IV fluids as well as dextrose containing fluids.  Patient's alcohol level was 260.  I suspect his presentation is due to withdrawal, given this level will obtain CT imaging of the head to evaluate for potential additional process that could be contributing to his agitation.  He has no signs of trauma to the head or neck. [DE]   1627 Patient did have marked improvement after being started on Precedex.  He did continue to receive CIWA medications.  CT of the head with no acute process.  Your CT of the abdomen or pelvis enterocolitis and hepatic steatosis but no acute pathology.  Patient did have some improvement of his CIWA but still markedly elevated and will require ICU admission.  Patient discussed with Dr. Michael who will admit. [DE]      ED Course User Index  [DE] Cale Domingo MD         Diagnoses as of 03/17/24 1628   Alcohol withdrawal syndrome with perceptual disturbance (CMS/HCC)       Medical Decision Making  This is a 44-year-old male with past medical history of  hypertension presenting the emergency department with concern for alcohol withdrawal.  Patient tachycardic upon presentation to the emergency department.  He does appear anxious and mildly internally stimulated on exam with tremors at baseline.  He also has some right upper quadrant tenderness palpation without guarding.  Abdomen is otherwise soft.  Presentation is concerning for severe alcohol withdrawal he does have a markedly elevated CIWA score upon presentation.  Initial plan is to treat with phenobarbital and diazepam.  I suspect patient's abdominal pain is related to his withdrawal, though given his endorsement that this is new compared to prior episodes of withdrawal we will obtain CT imaging for further evaluation.  Patient will require admission.    Procedure  Critical Care    Performed by: Cale Domingo MD  Authorized by: Cale Domingo MD    Critical care provider statement:     Critical care time (minutes):  63    Critical care time was exclusive of:  Separately billable procedures and treating other patients    Critical care was necessary to treat or prevent imminent or life-threatening deterioration of the following conditions:  Toxidrome    Critical care was time spent personally by me on the following activities:  Development of treatment plan with patient or surrogate, discussions with consultants, evaluation of patient's response to treatment, ordering and review of laboratory studies, ordering and review of radiographic studies, examination of patient, interpretation of cardiac output measurements, obtaining history from patient or surrogate and re-evaluation of patient's condition    Care discussed with: admitting provider         Cale Domingo MD  03/17/24 8474

## 2024-03-17 NOTE — H&P
History of Present Illness:  Neo Villalobos is a 44 y.o. male with a past medical history of alcohol use disorder with a history of DTs without seizure, Margoth Deluca tear, gout, paroxysmal A-fib, COPD, hypertension presents to [place] ED with concerns for alcohol withdrawal and abdominal pain.  In the ED, patient's EtOH was 260, CIWA 51, , , bicarb 15.  He received 20 mg of Valium, 10 mg Ativan and started on Precedex GGT. patient was agitated and refused phenobarbital although based on chart review, he has received phenobarbital multiple times in the past.  CT A/P showed enterocolitis, hepatic steatosis and colonic diverticulosis.  Due to extreme agitation and danger to self with pulling lines patient was placed on soft restraints.    Past medical history: As above  Past surgical history: Hip replacement 2021  Past social history: Alcohol abuse disorder (per chart review, a fifth vodka per day with multiple relapses after quitting), former smoker per chart review  Past family history: Mother has hypertension, diabetes and heart disease    Review of systems:  10 point review of systems performed and negative except as stated above.    Physical Exam:  General: Asleep   HEENT:  Normocephalic, atraumatic, mucus membranes moist.   Neck:  Trachea midline.  No JVD.    Chest:  Clear to auscultation bilaterally. No wheezes, rales, or rhonchi.  CV:  Regular rate and rhythm.  Positive S1/S2. No murmur, no gallops, no rubs  Abdomen: Bowel sounds present in all four quadrants, abdomen is soft, non-tender, non-distended.  Extremities:  No lower extremity edema or cyanosis.   Neurological: Asleep  Skin:  Warm and dry.    Daily progress:       Assessment + Plan:     Neuro:  #Acute alcohol intoxication and DTs  #Agitation  #Altered mental status  -Precedex GGT initiated in the ED, wean as tolerated  -Phenobarb taper ordered  -Valium as needed for CIWA  -500 mg thiamine IV 3 times daily for 3 days then transition to  p.o. 100 mg daily  -Folic acid 1 mg daily  -NPO due to altered mental status  -Due to extreme agitation and danger to self with pulling lines patient was placed on soft restraints.    CV:  #History of A-fib  -Holding home p.o. metoprolol while NPO, IV Lopressor as needed    Respiratory:  No acute abnormalities    GI:  #Transaminitis  #Acute enterocolitis  -Secondary to alcoholic hepatitis/steatosis.  Continue to monitor  -PPI as tolerated    Endo:  No acute abnormalities    Renal:  #Alcoholic ketoacidosis  #High anion gap metabolic acidosis likely secondary to above  -Likely secondary to alcoholic ketosis or starvation ketosis.  Hydroxybutyrate and UA ordered  -Thiamine 500 mg IV 3 times daily for 3 days then transition to oral  -D5 NS 75 cc/h    Hematological:  No acute abnormalities    ID:  No acute abnormalities    Vent/O2:   Lines/Devices: PIV  Drips: Precedex  ATBx: None  Fluids: D5 NS 75 cc/h  Diet: N.p.o.  PPX: Protonix, Lovenox  Code status: Assumed full  Dispo: ICU

## 2024-03-17 NOTE — PROGRESS NOTES
"Pharmacy Medication History Review    Neo Villalobos is a 44 y.o. male admitted for No Principal Problem: There is no principal problem currently on the Problem List. Please update the Problem List and refresh.. Pharmacy reviewed the patient's [id number] medications and allergies for accuracy.    The list below reflectives the updated PTA list. Please review each medication in order reconciliation for additional clarification and justification.  Prior to Admission medications    Medication Sig Start Date End Date Taking? Authorizing Provider   allopurinol (Zyloprim) 300 mg tablet Take 1 tablet (300 mg) by mouth once daily. 5/24/18  Yes Historical Provider, MD   colchicine 0.6 mg tablet Take 1 tablet (0.6 mg) by mouth 2 times a day as needed (Gout Attack). Take for about 4 days after attack resolves 5/1/23  Yes Historical Provider, MD   melatonin 3 mg tablet Take 1 tablet (3 mg) by mouth once daily at bedtime. 6/8/18  Yes Historical Provider, MD   metoprolol tartrate (Lopressor) 50 mg tablet Take 1 tablet by mouth 2 times a day. 11/13/19  Yes Historical Provider, MD   QUEtiapine (SEROquel) 100 mg tablet Take 1 tablet (100 mg) by mouth as needed at bedtime (take with Ropinirole for restless arm/leg). 5/24/18  Yes Historical Provider, MD   rOPINIRole (Requip) 4 mg tablet Take 1 tablet (4 mg) by mouth as needed at bedtime (take with seroquel for restless arm/leg). 2/26/24  Yes Historical Provider, MD        The list below reflectives the updated allergy list. Please review each documented allergy for additional clarification and justification.  Allergies  Reviewed by Balwinder Campbell RN on 3/17/2024        Severity Reactions Comments    Clonidine High Hives, Itching, GI Upset, Nausea/vomiting     Sulfamethoxazole-trimethoprim Medium Itching, GI Upset, Nausea/vomiting     Gabapentin Enacarbil Low Dizziness, Confusion \"I get superdizzy and confused like I'm on drugs.\"  He probably could take it if he needed it- he " had issues with it while drinking            Below are additional concerns with the patient's PTA list.    Patient unable to participate in interview at this time, this pharmacy technician reviewed medical and pharmacy records for medication history.    Loida Platt

## 2024-03-18 LAB
ALBUMIN SERPL BCP-MCNC: 3.5 G/DL (ref 3.4–5)
ALBUMIN SERPL BCP-MCNC: 3.7 G/DL (ref 3.4–5)
ALP SERPL-CCNC: 85 U/L (ref 33–120)
ALT SERPL W P-5'-P-CCNC: 261 U/L (ref 10–52)
AMPHETAMINES UR QL SCN: ABNORMAL
ANION GAP SERPL CALC-SCNC: 17 MMOL/L (ref 10–20)
ANION GAP SERPL CALC-SCNC: 25 MMOL/L (ref 10–20)
AST SERPL W P-5'-P-CCNC: 213 U/L (ref 9–39)
BARBITURATES UR QL SCN: ABNORMAL
BENZODIAZ UR QL SCN: ABNORMAL
BILIRUB SERPL-MCNC: 0.8 MG/DL (ref 0–1.2)
BUN SERPL-MCNC: 11 MG/DL (ref 6–23)
BUN SERPL-MCNC: 2 MG/DL (ref 6–23)
BZE UR QL SCN: ABNORMAL
CALCIUM SERPL-MCNC: 7.1 MG/DL (ref 8.6–10.3)
CALCIUM SERPL-MCNC: 7.3 MG/DL (ref 8.6–10.3)
CANNABINOIDS UR QL SCN: ABNORMAL
CHLORIDE SERPL-SCNC: 102 MMOL/L (ref 98–107)
CHLORIDE SERPL-SCNC: 97 MMOL/L (ref 98–107)
CO2 SERPL-SCNC: 15 MMOL/L (ref 21–32)
CO2 SERPL-SCNC: 21 MMOL/L (ref 21–32)
CREAT SERPL-MCNC: 0.72 MG/DL (ref 0.5–1.3)
CREAT SERPL-MCNC: 0.81 MG/DL (ref 0.5–1.3)
EGFRCR SERPLBLD CKD-EPI 2021: >90 ML/MIN/1.73M*2
EGFRCR SERPLBLD CKD-EPI 2021: >90 ML/MIN/1.73M*2
ERYTHROCYTE [DISTWIDTH] IN BLOOD BY AUTOMATED COUNT: 12.2 % (ref 11.5–14.5)
FENTANYL+NORFENTANYL UR QL SCN: ABNORMAL
GLUCOSE BLD MANUAL STRIP-MCNC: 123 MG/DL (ref 74–99)
GLUCOSE BLD MANUAL STRIP-MCNC: 146 MG/DL (ref 74–99)
GLUCOSE BLD MANUAL STRIP-MCNC: 96 MG/DL (ref 74–99)
GLUCOSE SERPL-MCNC: 101 MG/DL (ref 74–99)
GLUCOSE SERPL-MCNC: 119 MG/DL (ref 74–99)
HCT VFR BLD AUTO: 45.6 % (ref 41–52)
HGB BLD-MCNC: 15.3 G/DL (ref 13.5–17.5)
LACTATE SERPL-SCNC: 1.2 MMOL/L (ref 0.4–2)
MAGNESIUM SERPL-MCNC: 1.53 MG/DL (ref 1.6–2.4)
MCH RBC QN AUTO: 35.6 PG (ref 26–34)
MCHC RBC AUTO-ENTMCNC: 33.6 G/DL (ref 32–36)
MCV RBC AUTO: 106 FL (ref 80–100)
METHADONE UR QL SCN: ABNORMAL
NRBC BLD-RTO: 0 /100 WBCS (ref 0–0)
OPIATES UR QL SCN: ABNORMAL
OXYCODONE+OXYMORPHONE UR QL SCN: ABNORMAL
PCP UR QL SCN: ABNORMAL
PHOSPHATE SERPL-MCNC: 1.9 MG/DL (ref 2.5–4.9)
PLATELET # BLD AUTO: 131 X10*3/UL (ref 150–450)
POTASSIUM SERPL-SCNC: 3.9 MMOL/L (ref 3.5–5.3)
POTASSIUM SERPL-SCNC: 4 MMOL/L (ref 3.5–5.3)
PROT SERPL-MCNC: 5.6 G/DL (ref 6.4–8.2)
RBC # BLD AUTO: 4.3 X10*6/UL (ref 4.5–5.9)
SODIUM SERPL-SCNC: 133 MMOL/L (ref 136–145)
SODIUM SERPL-SCNC: 136 MMOL/L (ref 136–145)
WBC # BLD AUTO: 5 X10*3/UL (ref 4.4–11.3)

## 2024-03-18 PROCEDURE — 36415 COLL VENOUS BLD VENIPUNCTURE: CPT

## 2024-03-18 PROCEDURE — 2500000004 HC RX 250 GENERAL PHARMACY W/ HCPCS (ALT 636 FOR OP/ED)

## 2024-03-18 PROCEDURE — 82947 ASSAY GLUCOSE BLOOD QUANT: CPT

## 2024-03-18 PROCEDURE — 99291 CRITICAL CARE FIRST HOUR: CPT

## 2024-03-18 PROCEDURE — 83735 ASSAY OF MAGNESIUM: CPT

## 2024-03-18 PROCEDURE — 85027 COMPLETE CBC AUTOMATED: CPT

## 2024-03-18 PROCEDURE — 2020000001 HC ICU ROOM DAILY

## 2024-03-18 PROCEDURE — 80053 COMPREHEN METABOLIC PANEL: CPT

## 2024-03-18 PROCEDURE — C9113 INJ PANTOPRAZOLE SODIUM, VIA: HCPCS

## 2024-03-18 PROCEDURE — 80069 RENAL FUNCTION PANEL: CPT | Mod: CCI

## 2024-03-18 RX ORDER — PHENOBARBITAL SODIUM 65 MG/ML
260 INJECTION, SOLUTION INTRAMUSCULAR; INTRAVENOUS ONCE
Status: COMPLETED | OUTPATIENT
Start: 2024-03-18 | End: 2024-03-18

## 2024-03-18 RX ORDER — HYDRALAZINE HYDROCHLORIDE 20 MG/ML
5 INJECTION INTRAMUSCULAR; INTRAVENOUS ONCE
Status: COMPLETED | OUTPATIENT
Start: 2024-03-18 | End: 2024-03-18

## 2024-03-18 RX ORDER — PHENOBARBITAL SODIUM 65 MG/ML
260 INJECTION, SOLUTION INTRAMUSCULAR; INTRAVENOUS ONCE
Status: DISCONTINUED | OUTPATIENT
Start: 2024-03-18 | End: 2024-03-18

## 2024-03-18 RX ORDER — HYDRALAZINE HYDROCHLORIDE 20 MG/ML
INJECTION INTRAMUSCULAR; INTRAVENOUS
Status: COMPLETED
Start: 2024-03-18 | End: 2024-03-18

## 2024-03-18 RX ADMIN — DIAZEPAM 10 MG: 10 INJECTION, SOLUTION INTRAMUSCULAR; INTRAVENOUS at 16:08

## 2024-03-18 RX ADMIN — DIAZEPAM 10 MG: 10 INJECTION, SOLUTION INTRAMUSCULAR; INTRAVENOUS at 21:18

## 2024-03-18 RX ADMIN — DIAZEPAM 10 MG: 10 INJECTION, SOLUTION INTRAMUSCULAR; INTRAVENOUS at 06:03

## 2024-03-18 RX ADMIN — DEXMEDETOMIDINE HYDROCHLORIDE 0.4 MCG/KG/HR: 4 INJECTION, SOLUTION INTRAVENOUS at 00:49

## 2024-03-18 RX ADMIN — DIAZEPAM 10 MG: 10 INJECTION, SOLUTION INTRAMUSCULAR; INTRAVENOUS at 08:42

## 2024-03-18 RX ADMIN — HYDRALAZINE HYDROCHLORIDE 5 MG: 20 INJECTION INTRAMUSCULAR; INTRAVENOUS at 06:12

## 2024-03-18 RX ADMIN — PHENOBARBITAL SODIUM 260 MG: 65 INJECTION INTRAMUSCULAR; INTRAVENOUS at 10:28

## 2024-03-18 RX ADMIN — FOLIC ACID 1 MG: 5 INJECTION, SOLUTION INTRAMUSCULAR; INTRAVENOUS; SUBCUTANEOUS at 16:13

## 2024-03-18 RX ADMIN — DEXMEDETOMIDINE HYDROCHLORIDE 1.5 MCG/KG/HR: 4 INJECTION, SOLUTION INTRAVENOUS at 18:34

## 2024-03-18 RX ADMIN — DEXMEDETOMIDINE HYDROCHLORIDE 1.5 MCG/KG/HR: 4 INJECTION, SOLUTION INTRAVENOUS at 16:08

## 2024-03-18 RX ADMIN — DEXMEDETOMIDINE HYDROCHLORIDE 0.78 MCG/KG/HR: 4 INJECTION, SOLUTION INTRAVENOUS at 03:47

## 2024-03-18 RX ADMIN — DEXMEDETOMIDINE HYDROCHLORIDE 1.2 MCG/KG/HR: 4 INJECTION, SOLUTION INTRAVENOUS at 12:53

## 2024-03-18 RX ADMIN — ENOXAPARIN SODIUM 40 MG: 40 INJECTION SUBCUTANEOUS at 16:08

## 2024-03-18 RX ADMIN — THIAMINE HYDROCHLORIDE 500 MG: 100 INJECTION, SOLUTION INTRAMUSCULAR; INTRAVENOUS at 16:13

## 2024-03-18 RX ADMIN — THIAMINE HYDROCHLORIDE 500 MG: 100 INJECTION, SOLUTION INTRAMUSCULAR; INTRAVENOUS at 08:41

## 2024-03-18 RX ADMIN — THIAMINE HYDROCHLORIDE 500 MG: 100 INJECTION, SOLUTION INTRAMUSCULAR; INTRAVENOUS at 20:01

## 2024-03-18 RX ADMIN — DEXMEDETOMIDINE HYDROCHLORIDE 1.2 MCG/KG/HR: 4 INJECTION, SOLUTION INTRAVENOUS at 07:30

## 2024-03-18 RX ADMIN — DEXMEDETOMIDINE HYDROCHLORIDE 1 MCG/KG/HR: 4 INJECTION, SOLUTION INTRAVENOUS at 21:18

## 2024-03-18 RX ADMIN — DIAZEPAM 10 MG: 10 INJECTION, SOLUTION INTRAMUSCULAR; INTRAVENOUS at 14:04

## 2024-03-18 RX ADMIN — PHENOBARBITAL SODIUM 97.5 MG: 65 INJECTION INTRAMUSCULAR; INTRAVENOUS at 16:08

## 2024-03-18 RX ADMIN — DEXMEDETOMIDINE HYDROCHLORIDE 0.98 MCG/KG/HR: 4 INJECTION, SOLUTION INTRAVENOUS at 05:37

## 2024-03-18 RX ADMIN — DIAZEPAM 10 MG: 10 INJECTION, SOLUTION INTRAMUSCULAR; INTRAVENOUS at 01:42

## 2024-03-18 RX ADMIN — DEXTROSE AND SODIUM CHLORIDE 75 ML/HR: 5; 900 INJECTION, SOLUTION INTRAVENOUS at 06:03

## 2024-03-18 RX ADMIN — DIAZEPAM 10 MG: 10 INJECTION, SOLUTION INTRAMUSCULAR; INTRAVENOUS at 19:17

## 2024-03-18 RX ADMIN — PANTOPRAZOLE SODIUM 40 MG: 40 INJECTION, POWDER, FOR SOLUTION INTRAVENOUS at 08:42

## 2024-03-18 RX ADMIN — DIAZEPAM 10 MG: 10 INJECTION, SOLUTION INTRAMUSCULAR; INTRAVENOUS at 23:18

## 2024-03-18 RX ADMIN — PHENOBARBITAL SODIUM 97.5 MG: 65 INJECTION INTRAMUSCULAR; INTRAVENOUS at 08:41

## 2024-03-18 RX ADMIN — DEXMEDETOMIDINE HYDROCHLORIDE 1.2 MCG/KG/HR: 4 INJECTION, SOLUTION INTRAVENOUS at 08:45

## 2024-03-18 ASSESSMENT — LIFESTYLE VARIABLES
ORIENTATION AND CLOUDING OF SENSORIUM: DISORIENTED FOR PLACE OR PERSON
VISUAL DISTURBANCES: MODERATE SENSITIVITY
TOTAL SCORE: 12
TACTILE DISTURBANCES: MILD ITCHING, PINS AND NEEDLES, BURNING OR NUMBNESS
ORIENTATION AND CLOUDING OF SENSORIUM: DISORIENTED FOR PLACE OR PERSON
TREMOR: NO TREMOR
AUDITORY DISTURBANCES: NOT PRESENT
ANXIETY: MODERATELY ANXIOUS, OR GUARDED, SO ANXIETY IS INFERRED
TREMOR: NO TREMOR
ANXIETY: 3
VISUAL DISTURBANCES: VERY MILD SENSITIVITY
TOTAL SCORE: 18
NAUSEA AND VOMITING: NO NAUSEA AND NO VOMITING
VISUAL DISTURBANCES: NOT PRESENT
ANXIETY: 2
HEADACHE, FULLNESS IN HEAD: SEVERE
PAROXYSMAL SWEATS: NO SWEAT VISIBLE
VISUAL DISTURBANCES: NOT PRESENT
AUDITORY DISTURBANCES: NOT PRESENT
AUDITORY DISTURBANCES: MILD HARSHNESS OR ABILITY TO FRIGHTEN
NAUSEA AND VOMITING: NO NAUSEA AND NO VOMITING
TREMOR: NO TREMOR
AGITATION: SOMEWHAT MORE THAN NORMAL ACTIVITY
AGITATION: MODERATELY FIDGETY AND RESTLESS
AGITATION: MODERATELY FIDGETY AND RESTLESS
ANXIETY: 2
HEADACHE, FULLNESS IN HEAD: NOT PRESENT
TACTILE DISTURBANCES: MILD ITCHING, PINS AND NEEDLES, BURNING OR NUMBNESS
NAUSEA AND VOMITING: NO NAUSEA AND NO VOMITING
TOTAL SCORE: 21
AUDITORY DISTURBANCES: NOT PRESENT
ORIENTATION AND CLOUDING OF SENSORIUM: DISORIENTED FOR PLACE OR PERSON
HEADACHE, FULLNESS IN HEAD: MODERATE
TOTAL SCORE: 23
TREMOR: 3
ANXIETY: MODERATELY ANXIOUS, OR GUARDED, SO ANXIETY IS INFERRED
AGITATION: SOMEWHAT MORE THAN NORMAL ACTIVITY
NAUSEA AND VOMITING: NO NAUSEA AND NO VOMITING
TACTILE DISTURBANCES: MILD ITCHING, PINS AND NEEDLES, BURNING OR NUMBNESS
HEADACHE, FULLNESS IN HEAD: VERY MILD
TACTILE DISTURBANCES: MILD ITCHING, PINS AND NEEDLES, BURNING OR NUMBNESS
PAROXYSMAL SWEATS: BARELY PERCEPTIBLE SWEATING, PALMS MOIST
ANXIETY: NO ANXIETY, AT EASE
PAROXYSMAL SWEATS: 2
TOTAL SCORE: 4
VISUAL DISTURBANCES: MILD SENSITIVITY
ANXIETY: 3
TOTAL SCORE: 19
ORIENTATION AND CLOUDING OF SENSORIUM: DISORIENTED FOR PLACE OR PERSON
AUDITORY DISTURBANCES: NOT PRESENT
HEADACHE, FULLNESS IN HEAD: MODERATE
PAROXYSMAL SWEATS: BARELY PERCEPTIBLE SWEATING, PALMS MOIST
AGITATION: NORMAL ACTIVITY
AGITATION: 2
TOTAL SCORE: 21
AUDITORY DISTURBANCES: VERY MILD HARSHNESS OR ABILITY TO FRIGHTEN
PAROXYSMAL SWEATS: BARELY PERCEPTIBLE SWEATING, PALMS MOIST
AUDITORY DISTURBANCES: NOT PRESENT
NAUSEA AND VOMITING: MILD NAUSEA WITH NO VOMITING
PAROXYSMAL SWEATS: NO SWEAT VISIBLE
AGITATION: NORMAL ACTIVITY
NAUSEA AND VOMITING: NO NAUSEA AND NO VOMITING
TACTILE DISTURBANCES: MILD ITCHING, PINS AND NEEDLES, BURNING OR NUMBNESS
TACTILE DISTURBANCES: VERY MILD ITCHING, PINS AND NEEDLES, BURNING OR NUMBNESS
NAUSEA AND VOMITING: NO NAUSEA AND NO VOMITING
TREMOR: 2
ORIENTATION AND CLOUDING OF SENSORIUM: DISORIENTED FOR PLACE OR PERSON
PAROXYSMAL SWEATS: NO SWEAT VISIBLE
VISUAL DISTURBANCES: MODERATE SENSITIVITY
AUDITORY DISTURBANCES: NOT PRESENT
TREMOR: NO TREMOR
ORIENTATION AND CLOUDING OF SENSORIUM: DISORIENTED FOR PLACE OR PERSON
ORIENTATION AND CLOUDING OF SENSORIUM: DISORIENTED FOR PLACE OR PERSON
AGITATION: 2
ANXIETY: NO ANXIETY, AT EASE
NAUSEA AND VOMITING: NO NAUSEA AND NO VOMITING
VISUAL DISTURBANCES: MILD SENSITIVITY
TREMOR: 2
HEADACHE, FULLNESS IN HEAD: MILD
HEADACHE, FULLNESS IN HEAD: NOT PRESENT
ORIENTATION AND CLOUDING OF SENSORIUM: DISORIENTED FOR PLACE OR PERSON
VISUAL DISTURBANCES: MODERATELY SEVERE HALLUCINATIONS
TOTAL SCORE: 4
PAROXYSMAL SWEATS: NO SWEAT VISIBLE
HEADACHE, FULLNESS IN HEAD: MILD
TREMOR: NO TREMOR

## 2024-03-18 ASSESSMENT — PAIN - FUNCTIONAL ASSESSMENT
PAIN_FUNCTIONAL_ASSESSMENT: 0-10
PAIN_FUNCTIONAL_ASSESSMENT: 0-10

## 2024-03-18 ASSESSMENT — PAIN SCALES - GENERAL
PAINLEVEL_OUTOF10: 0 - NO PAIN

## 2024-03-18 NOTE — PROGRESS NOTES
Subjective:  Patient seen at bedside.  Intermittently confused.    Admission history:  Neo Villalobos is a 44 y.o. male with a past medical history of alcohol use disorder with a history of DTs without seizure, Margoth Deluca tear, gout, paroxysmal A-fib, COPD, hypertension presents to [place] ED with concerns for alcohol withdrawal and abdominal pain.  In the ED, patient's EtOH was 260, CIWA 51, , , bicarb 15.  He received 20 mg of Valium, 10 mg Ativan and started on Precedex GGT. patient was agitated and refused phenobarbital although based on chart review, he has received phenobarbital multiple times in the past.  CT A/P showed enterocolitis, hepatic steatosis and colonic diverticulosis.  Due to extreme agitation and danger to self with pulling lines patient was placed on soft restraints.    Past medical history: As above  Past surgical history: Hip replacement 2021  Past social history: Alcohol abuse disorder (per chart review, a fifth vodka per day with multiple relapses after quitting), former smoker per chart review  Past family history: Mother has hypertension, diabetes and heart disease    Review of systems:  10 point review of systems performed and negative except as stated above.    Physical Exam:  General: Asleep   HEENT:  Normocephalic, atraumatic, mucus membranes moist.   Neck:  Trachea midline.  No JVD.    Chest:  Clear to auscultation bilaterally. No wheezes, rales, or rhonchi.  CV:  Regular rate and rhythm.  Positive S1/S2. No murmur, no gallops, no rubs  Abdomen: Bowel sounds present in all four quadrants, abdomen is soft, non-tender, non-distended.  Extremities:  No lower extremity edema or cyanosis.   Neurological: Asleep  Skin:  Warm and dry.    Daily progress:   3/18: Phenobarb 260 mg ordered since load was not given in the ED.  Acute urinary retention, Emanuel catheter inserted.  Increase D5 normal saline rate to 125 cc/h.  Patient continues to be agitated and yelling  intermittently, danger to self with pulling lines, soft restraints renewed.  RFP and magnesium levels twice daily.    Assessment + Plan:     Neuro:  #Acute alcohol intoxication and DTs  #Alcohol withdrawal  #Agitation  #Altered mental status  -Precedex GGT initiated in the ED, wean as tolerated  -Phenobarb taper ordered  -Valium as needed for CIWA  -500 mg thiamine IV 3 times daily for 3 days then transition to p.o. 100 mg daily  -Folic acid IV  -NPO due to altered mental status  -Due to extreme agitation and danger to self with pulling lines patient was placed on soft restraints.    CV:  #History of A-fib  -Holding home p.o. metoprolol while NPO, IV Lopressor as needed    Respiratory:  No acute abnormalities    GI:  #Transaminitis, downtrending  #Acute enterocolitis  -Secondary to alcoholic hepatitis/steatosis.  Continue to monitor  -PPI as tolerated    Endo:  No acute abnormalities    Renal:  #Alcoholic ketoacidosis  #High anion gap metabolic acidosis likely secondary to above  -Likely secondary to alcoholic ketosis or starvation ketosis.    -Thiamine 500 mg IV 3 times daily for 3 days then transition to oral  -D5  cc/h    Hematological:  No acute abnormalities    ID:  No acute abnormalities    Vent/O2:   Lines/Devices: PIV  Drips: Precedex  ATBx: None  Fluids: D5  cc/h  Diet: N.p.o.  PPX: Protonix, Lovenox  Code status: Assumed full  Dispo: ICU

## 2024-03-18 NOTE — SIGNIFICANT EVENT
ICU Attending Summary:     Mr. Villalobos is a 45yo male with history of EtOH abuse with prior DTs without seizure treated with phenobarb taper, prior Margoth Deluca tear, prior afib on metoprolol, gout on PRN colchicine presenting with EtOH withdrawal and abdominal pain with CT showing enterocolitis. Due to severity of withdrawal, he is admitted to ICU for further management.     #Acute alcohol intoxication and DTs: EtOH 260 and CIWA score 51 on presentation. Received 20mg valium, 10mg ativan and was started on precedex ggt. Pt had refused phenobarb when agitated, however has tolerated in the past. Did not receive phenobarb load in the ED (though it was marked as given) Will give 260mg x1 IV and then phenobarb taper and wean Precedex. Initiate thiamine 500mg tid for three days and 100mg thereafter. NPO due to mentation     #History of afib: hold PO metop while NPO, initiate prn IV metoprolol for HR >110      #Acute enterocolitis: benign abdominal exam, supportive care     #Transaminitis: likely alcoholic hepatitis, CT shows hepatic steatosis. Trend LFTs      #HAGMA: likely alcoholic ketosis vs starvation ketosis. Continue thiamine and D5LR, increase rate today to 125cc/hr. repeat CMP daily        Lines/tubes: PIVs  Prophylaxis: Lovenox, PPI  Drips: Precedex, D5LR  Fluids: PRN  Oxygen: NC  Nutrition: NPO  Restraints: bilateral wrist restraints due to pulling at lines/tubes and danger to self. Will reassess regularly  Code status: presumed full code, follow up with NOK  Dispo: ICU

## 2024-03-18 NOTE — CARE PLAN
Problem: Skin  Goal: Decreased wound size/increased tissue granulation at next dressing change  Outcome: Progressing  Flowsheets (Taken 3/18/2024 0305)  Decreased wound size/increased tissue granulation at next dressing change: Protective dressings over bony prominences  Goal: Participates in plan/prevention/treatment measures  Outcome: Progressing  Flowsheets (Taken 3/18/2024 0305)  Participates in plan/prevention/treatment measures: Elevate heels  Goal: Prevent/manage excess moisture  Outcome: Progressing  Flowsheets (Taken 3/18/2024 0305)  Prevent/manage excess moisture: Moisturize dry skin  Goal: Prevent/minimize sheer/friction injuries  Outcome: Progressing  Flowsheets (Taken 3/18/2024 0305)  Prevent/minimize sheer/friction injuries: Turn/reposition every 2 hours/use positioning/transfer devices  Goal: Promote/optimize nutrition  Outcome: Progressing  Flowsheets (Taken 3/18/2024 0305)  Promote/optimize nutrition: Monitor/record intake including meals   The patient's goals for the shift include      The clinical goals for the shift include Pt will remain hemodynamically stable, co level will remain wnl and pox

## 2024-03-18 NOTE — PROGRESS NOTES
This  attempted to speak with patient to offer resources for ETOH but patient is sedated due to active withdrawal symptoms. SW will follow up as appropriate.  will continue to follow. Message with questions.  HANNAH Arce

## 2024-03-19 ENCOUNTER — APPOINTMENT (OUTPATIENT)
Dept: RADIOLOGY | Facility: HOSPITAL | Age: 44
DRG: 894 | End: 2024-03-19
Payer: COMMERCIAL

## 2024-03-19 LAB
ALBUMIN SERPL BCP-MCNC: 3.5 G/DL (ref 3.4–5)
ALBUMIN SERPL BCP-MCNC: 3.6 G/DL (ref 3.4–5)
ALP SERPL-CCNC: 86 U/L (ref 33–120)
ALT SERPL W P-5'-P-CCNC: 189 U/L (ref 10–52)
ANION GAP BLDA CALCULATED.4IONS-SCNC: 9 MMO/L (ref 10–25)
ANION GAP SERPL CALC-SCNC: 14 MMOL/L (ref 10–20)
ANION GAP SERPL CALC-SCNC: 15 MMOL/L (ref 10–20)
AST SERPL W P-5'-P-CCNC: 153 U/L (ref 9–39)
ATRIAL RATE: 117 BPM
BASE EXCESS BLDA CALC-SCNC: 1.2 MMOL/L (ref -2–3)
BILIRUB SERPL-MCNC: 1.2 MG/DL (ref 0–1.2)
BODY TEMPERATURE: 37 DEGREES CELSIUS
BUN SERPL-MCNC: 3 MG/DL (ref 6–23)
BUN SERPL-MCNC: 4 MG/DL (ref 6–23)
CA-I BLDA-SCNC: 1.01 MMOL/L (ref 1.1–1.33)
CALCIUM SERPL-MCNC: 7.3 MG/DL (ref 8.6–10.3)
CALCIUM SERPL-MCNC: 7.5 MG/DL (ref 8.6–10.3)
CHLORIDE BLDA-SCNC: 101 MMOL/L (ref 98–107)
CHLORIDE SERPL-SCNC: 102 MMOL/L (ref 98–107)
CHLORIDE SERPL-SCNC: 106 MMOL/L (ref 98–107)
CO2 SERPL-SCNC: 22 MMOL/L (ref 21–32)
CO2 SERPL-SCNC: 23 MMOL/L (ref 21–32)
CREAT SERPL-MCNC: 0.64 MG/DL (ref 0.5–1.3)
CREAT SERPL-MCNC: 0.7 MG/DL (ref 0.5–1.3)
EGFRCR SERPLBLD CKD-EPI 2021: >90 ML/MIN/1.73M*2
EGFRCR SERPLBLD CKD-EPI 2021: >90 ML/MIN/1.73M*2
GLUCOSE BLD MANUAL STRIP-MCNC: 119 MG/DL (ref 74–99)
GLUCOSE BLDA-MCNC: 121 MG/DL (ref 74–99)
GLUCOSE SERPL-MCNC: 116 MG/DL (ref 74–99)
GLUCOSE SERPL-MCNC: 128 MG/DL (ref 74–99)
HCO3 BLDA-SCNC: 26 MMOL/L (ref 22–26)
HCT VFR BLD EST: 47 % (ref 41–52)
HGB BLDA-MCNC: 15.5 G/DL (ref 13.5–17.5)
INHALED O2 CONCENTRATION: 50 %
LACTATE BLDA-SCNC: 0.9 MMOL/L (ref 0.4–2)
MAGNESIUM SERPL-MCNC: 1.71 MG/DL (ref 1.6–2.4)
OXYHGB MFR BLDA: 96 % (ref 94–98)
P AXIS: 49 DEGREES
PCO2 BLDA: 41 MM HG (ref 38–42)
PEEP CMH2O: 5 CM H2O
PH BLDA: 7.41 PH (ref 7.38–7.42)
PHOSPHATE SERPL-MCNC: 2.4 MG/DL (ref 2.5–4.9)
PHOSPHATE SERPL-MCNC: 3.3 MG/DL (ref 2.5–4.9)
PO2 BLDA: 91 MM HG (ref 85–95)
POTASSIUM BLDA-SCNC: 3.2 MMOL/L (ref 3.5–5.3)
POTASSIUM SERPL-SCNC: 3.5 MMOL/L (ref 3.5–5.3)
POTASSIUM SERPL-SCNC: 3.7 MMOL/L (ref 3.5–5.3)
PR INTERVAL: 145 MS
PROT SERPL-MCNC: 5.9 G/DL (ref 6.4–8.2)
Q ONSET: 252 MS
QRS COUNT: 19 BEATS
QRS DURATION: 93 MS
QT INTERVAL: 329 MS
QTC CALCULATION(BAZETT): 459 MS
QTC FREDERICIA: 411 MS
R AXIS: -11 DEGREES
SAO2 % BLDA: 99 % (ref 94–100)
SODIUM BLDA-SCNC: 133 MMOL/L (ref 136–145)
SODIUM SERPL-SCNC: 135 MMOL/L (ref 136–145)
SODIUM SERPL-SCNC: 139 MMOL/L (ref 136–145)
T AXIS: -27 DEGREES
T OFFSET: 416 MS
TIDAL VOLUME: 500 ML
VENTILATOR MODE: ABNORMAL
VENTILATOR RATE: 20 BPM
VENTRICULAR RATE: 117 BPM

## 2024-03-19 PROCEDURE — 36415 COLL VENOUS BLD VENIPUNCTURE: CPT

## 2024-03-19 PROCEDURE — 2500000004 HC RX 250 GENERAL PHARMACY W/ HCPCS (ALT 636 FOR OP/ED): Performed by: STUDENT IN AN ORGANIZED HEALTH CARE EDUCATION/TRAINING PROGRAM

## 2024-03-19 PROCEDURE — C9113 INJ PANTOPRAZOLE SODIUM, VIA: HCPCS

## 2024-03-19 PROCEDURE — 2500000004 HC RX 250 GENERAL PHARMACY W/ HCPCS (ALT 636 FOR OP/ED)

## 2024-03-19 PROCEDURE — 82435 ASSAY OF BLOOD CHLORIDE: CPT

## 2024-03-19 PROCEDURE — 82947 ASSAY GLUCOSE BLOOD QUANT: CPT

## 2024-03-19 PROCEDURE — 84075 ASSAY ALKALINE PHOSPHATASE: CPT

## 2024-03-19 PROCEDURE — 83735 ASSAY OF MAGNESIUM: CPT

## 2024-03-19 PROCEDURE — 5A1945Z RESPIRATORY VENTILATION, 24-96 CONSECUTIVE HOURS: ICD-10-PCS | Performed by: STUDENT IN AN ORGANIZED HEALTH CARE EDUCATION/TRAINING PROGRAM

## 2024-03-19 PROCEDURE — 80069 RENAL FUNCTION PANEL: CPT | Mod: CCI | Performed by: STUDENT IN AN ORGANIZED HEALTH CARE EDUCATION/TRAINING PROGRAM

## 2024-03-19 PROCEDURE — 84100 ASSAY OF PHOSPHORUS: CPT

## 2024-03-19 PROCEDURE — 2500000001 HC RX 250 WO HCPCS SELF ADMINISTERED DRUGS (ALT 637 FOR MEDICARE OP)

## 2024-03-19 PROCEDURE — 2020000001 HC ICU ROOM DAILY

## 2024-03-19 PROCEDURE — 94002 VENT MGMT INPAT INIT DAY: CPT

## 2024-03-19 PROCEDURE — 99291 CRITICAL CARE FIRST HOUR: CPT

## 2024-03-19 PROCEDURE — 0BH17EZ INSERTION OF ENDOTRACHEAL AIRWAY INTO TRACHEA, VIA NATURAL OR ARTIFICIAL OPENING: ICD-10-PCS | Performed by: STUDENT IN AN ORGANIZED HEALTH CARE EDUCATION/TRAINING PROGRAM

## 2024-03-19 PROCEDURE — 84132 ASSAY OF SERUM POTASSIUM: CPT | Performed by: STUDENT IN AN ORGANIZED HEALTH CARE EDUCATION/TRAINING PROGRAM

## 2024-03-19 PROCEDURE — 2500000005 HC RX 250 GENERAL PHARMACY W/O HCPCS

## 2024-03-19 PROCEDURE — 36600 WITHDRAWAL OF ARTERIAL BLOOD: CPT

## 2024-03-19 PROCEDURE — 31500 INSERT EMERGENCY AIRWAY: CPT | Mod: GC | Performed by: STUDENT IN AN ORGANIZED HEALTH CARE EDUCATION/TRAINING PROGRAM

## 2024-03-19 PROCEDURE — 31500 INSERT EMERGENCY AIRWAY: CPT | Performed by: STUDENT IN AN ORGANIZED HEALTH CARE EDUCATION/TRAINING PROGRAM

## 2024-03-19 PROCEDURE — 2500000005 HC RX 250 GENERAL PHARMACY W/O HCPCS: Performed by: STUDENT IN AN ORGANIZED HEALTH CARE EDUCATION/TRAINING PROGRAM

## 2024-03-19 PROCEDURE — 84132 ASSAY OF SERUM POTASSIUM: CPT

## 2024-03-19 PROCEDURE — 74018 RADEX ABDOMEN 1 VIEW: CPT

## 2024-03-19 PROCEDURE — 71045 X-RAY EXAM CHEST 1 VIEW: CPT

## 2024-03-19 RX ORDER — MAGNESIUM SULFATE HEPTAHYDRATE 40 MG/ML
2 INJECTION, SOLUTION INTRAVENOUS ONCE
Status: COMPLETED | OUTPATIENT
Start: 2024-03-19 | End: 2024-03-19

## 2024-03-19 RX ORDER — MIDAZOLAM HYDROCHLORIDE 5 MG/ML
5 INJECTION INTRAMUSCULAR; INTRAVENOUS ONCE
Status: COMPLETED | OUTPATIENT
Start: 2024-03-19 | End: 2024-03-19

## 2024-03-19 RX ORDER — PROPOFOL 10 MG/ML
INJECTION, EMULSION INTRAVENOUS
Status: COMPLETED
Start: 2024-03-19 | End: 2024-03-19

## 2024-03-19 RX ORDER — LABETALOL HYDROCHLORIDE 5 MG/ML
5 INJECTION, SOLUTION INTRAVENOUS EVERY 4 HOURS PRN
Status: DISCONTINUED | OUTPATIENT
Start: 2024-03-19 | End: 2024-03-20

## 2024-03-19 RX ORDER — ROCURONIUM BROMIDE 10 MG/ML
0.64 INJECTION, SOLUTION INTRAVENOUS ONCE
Status: COMPLETED | OUTPATIENT
Start: 2024-03-19 | End: 2024-03-19

## 2024-03-19 RX ORDER — PROPOFOL 10 MG/ML
5-20 INJECTION, EMULSION INTRAVENOUS CONTINUOUS
Status: DISCONTINUED | OUTPATIENT
Start: 2024-03-19 | End: 2024-03-20

## 2024-03-19 RX ORDER — FENTANYL CITRATE-0.9 % NACL/PF 10 MCG/ML
25-200 PLASTIC BAG, INJECTION (ML) INTRAVENOUS CONTINUOUS
Status: DISCONTINUED | OUTPATIENT
Start: 2024-03-19 | End: 2024-03-22

## 2024-03-19 RX ORDER — FENTANYL CITRATE 50 UG/ML
25 INJECTION, SOLUTION INTRAMUSCULAR; INTRAVENOUS ONCE
Status: DISCONTINUED | OUTPATIENT
Start: 2024-03-19 | End: 2024-03-23 | Stop reason: HOSPADM

## 2024-03-19 RX ADMIN — DIAZEPAM 10 MG: 10 INJECTION, SOLUTION INTRAMUSCULAR; INTRAVENOUS at 01:20

## 2024-03-19 RX ADMIN — Medication 25 MCG/HR: at 09:46

## 2024-03-19 RX ADMIN — DEXMEDETOMIDINE HYDROCHLORIDE 1.05 MCG/KG/HR: 4 INJECTION, SOLUTION INTRAVENOUS at 17:44

## 2024-03-19 RX ADMIN — FOLIC ACID 1 MG: 1 TABLET ORAL at 12:05

## 2024-03-19 RX ADMIN — DEXMEDETOMIDINE HYDROCHLORIDE 1 MCG/KG/HR: 4 INJECTION, SOLUTION INTRAVENOUS at 04:02

## 2024-03-19 RX ADMIN — DEXMEDETOMIDINE HYDROCHLORIDE 1.05 MCG/KG/HR: 4 INJECTION, SOLUTION INTRAVENOUS at 21:10

## 2024-03-19 RX ADMIN — DEXMEDETOMIDINE HYDROCHLORIDE 1.05 MCG/KG/HR: 4 INJECTION, SOLUTION INTRAVENOUS at 14:30

## 2024-03-19 RX ADMIN — MIDAZOLAM 5 MG: 5 INJECTION INTRAMUSCULAR; INTRAVENOUS at 10:45

## 2024-03-19 RX ADMIN — PROPOFOL 50 MCG/KG/MIN: 10 INJECTION, EMULSION INTRAVENOUS at 23:55

## 2024-03-19 RX ADMIN — PHENOBARBITAL SODIUM 97.5 MG: 65 INJECTION INTRAMUSCULAR; INTRAVENOUS at 08:24

## 2024-03-19 RX ADMIN — DIAZEPAM 10 MG: 10 INJECTION, SOLUTION INTRAMUSCULAR; INTRAVENOUS at 03:21

## 2024-03-19 RX ADMIN — PROPOFOL 50 MCG/KG/MIN: 10 INJECTION, EMULSION INTRAVENOUS at 12:16

## 2024-03-19 RX ADMIN — Medication 175 MCG/HR: at 13:39

## 2024-03-19 RX ADMIN — PROPOFOL 20 MCG/KG/MIN: 10 INJECTION, EMULSION INTRAVENOUS at 09:25

## 2024-03-19 RX ADMIN — Medication: at 10:00

## 2024-03-19 RX ADMIN — ROCURONIUM BROMIDE 70 MG: 10 INJECTION, SOLUTION INTRAVENOUS at 09:14

## 2024-03-19 RX ADMIN — ENOXAPARIN SODIUM 40 MG: 40 INJECTION SUBCUTANEOUS at 17:00

## 2024-03-19 RX ADMIN — Medication 1 TABLET: at 12:06

## 2024-03-19 RX ADMIN — DIAZEPAM 10 MG: 10 INJECTION, SOLUTION INTRAMUSCULAR; INTRAVENOUS at 08:02

## 2024-03-19 RX ADMIN — DEXMEDETOMIDINE HYDROCHLORIDE 1 MCG/KG/HR: 4 INJECTION, SOLUTION INTRAVENOUS at 06:54

## 2024-03-19 RX ADMIN — MIDAZOLAM 5 MG: 5 INJECTION INTRAMUSCULAR; INTRAVENOUS at 10:30

## 2024-03-19 RX ADMIN — MAGNESIUM SULFATE HEPTAHYDRATE 2 G: 40 INJECTION, SOLUTION INTRAVENOUS at 08:04

## 2024-03-19 RX ADMIN — THIAMINE HYDROCHLORIDE 500 MG: 100 INJECTION, SOLUTION INTRAMUSCULAR; INTRAVENOUS at 16:59

## 2024-03-19 RX ADMIN — PHENOBARBITAL SODIUM 65 MG: 65 INJECTION INTRAMUSCULAR at 23:56

## 2024-03-19 RX ADMIN — PROPOFOL 50 MCG/KG/MIN: 10 INJECTION, EMULSION INTRAVENOUS at 21:11

## 2024-03-19 RX ADMIN — PHENOBARBITAL SODIUM 65 MG: 65 INJECTION INTRAMUSCULAR at 17:00

## 2024-03-19 RX ADMIN — DIAZEPAM 10 MG: 10 INJECTION, SOLUTION INTRAMUSCULAR; INTRAVENOUS at 05:39

## 2024-03-19 RX ADMIN — PROPOFOL 50 MCG/KG/MIN: 10 INJECTION, EMULSION INTRAVENOUS at 15:16

## 2024-03-19 RX ADMIN — PROPOFOL 50 MCG/KG/MIN: 10 INJECTION, EMULSION INTRAVENOUS at 17:43

## 2024-03-19 RX ADMIN — DEXMEDETOMIDINE HYDROCHLORIDE 1.4 MCG/KG/HR: 4 INJECTION, SOLUTION INTRAVENOUS at 12:04

## 2024-03-19 RX ADMIN — Medication 175 MCG/HR: at 19:01

## 2024-03-19 RX ADMIN — PHENOBARBITAL SODIUM 97.5 MG: 65 INJECTION INTRAMUSCULAR; INTRAVENOUS at 00:22

## 2024-03-19 RX ADMIN — THIAMINE HYDROCHLORIDE 500 MG: 100 INJECTION, SOLUTION INTRAMUSCULAR; INTRAVENOUS at 12:05

## 2024-03-19 RX ADMIN — PANTOPRAZOLE SODIUM 40 MG: 40 INJECTION, POWDER, FOR SOLUTION INTRAVENOUS at 12:06

## 2024-03-19 RX ADMIN — Medication: at 20:00

## 2024-03-19 RX ADMIN — DEXMEDETOMIDINE HYDROCHLORIDE 1 MCG/KG/HR: 4 INJECTION, SOLUTION INTRAVENOUS at 01:20

## 2024-03-19 ASSESSMENT — LIFESTYLE VARIABLES
TOTAL SCORE: 15
VISUAL DISTURBANCES: MODERATELY SEVERE HALLUCINATIONS
HEADACHE, FULLNESS IN HEAD: VERY MILD
ORIENTATION AND CLOUDING OF SENSORIUM: DISORIENTED FOR DATA BY NO MORE THAN 2 CALENDAR DAYS
TOTAL SCORE: 21
ORIENTATION AND CLOUDING OF SENSORIUM: ORIENTED AND CAN DO SERIAL ADDITIONS
ANXIETY: MODERATELY ANXIOUS, OR GUARDED, SO ANXIETY IS INFERRED
TOTAL SCORE: 14
VISUAL DISTURBANCES: VERY MILD SENSITIVITY
TACTILE DISTURBANCES: MILD ITCHING, PINS AND NEEDLES, BURNING OR NUMBNESS
TOTAL SCORE: 0
ANXIETY: 6
NAUSEA AND VOMITING: NO NAUSEA AND NO VOMITING
TACTILE DISTURBANCES: MILD ITCHING, PINS AND NEEDLES, BURNING OR NUMBNESS
VISUAL DISTURBANCES: NOT PRESENT
TREMOR: NO TREMOR
HEADACHE, FULLNESS IN HEAD: NOT PRESENT
PAROXYSMAL SWEATS: BARELY PERCEPTIBLE SWEATING, PALMS MOIST
VISUAL DISTURBANCES: VERY MILD SENSITIVITY
AGITATION: MODERATELY FIDGETY AND RESTLESS
NAUSEA AND VOMITING: MILD NAUSEA WITH NO VOMITING
AUDITORY DISTURBANCES: NOT PRESENT
ORIENTATION AND CLOUDING OF SENSORIUM: DISORIENTED FOR PLACE OR PERSON
NAUSEA AND VOMITING: NO NAUSEA AND NO VOMITING
ANXIETY: NO ANXIETY, AT EASE
NAUSEA AND VOMITING: NO NAUSEA AND NO VOMITING
AGITATION: 5
HEADACHE, FULLNESS IN HEAD: NOT PRESENT
TOTAL SCORE: 19
HEADACHE, FULLNESS IN HEAD: VERY MILD
ANXIETY: MILDLY ANXIOUS
VISUAL DISTURBANCES: NOT PRESENT
NAUSEA AND VOMITING: NO NAUSEA AND NO VOMITING
TREMOR: 2
TREMOR: 3
AUDITORY DISTURBANCES: NOT PRESENT
TACTILE DISTURBANCES: MILD ITCHING, PINS AND NEEDLES, BURNING OR NUMBNESS
PAROXYSMAL SWEATS: BARELY PERCEPTIBLE SWEATING, PALMS MOIST
ANXIETY: 2
PAROXYSMAL SWEATS: NO SWEAT VISIBLE
AGITATION: NORMAL ACTIVITY
PAROXYSMAL SWEATS: BEADS OF SWEAT OBVIOUS ON FOREHEAD
TREMOR: NO TREMOR
ORIENTATION AND CLOUDING OF SENSORIUM: CANNOT DO SERIAL ADDITIONS OR IS UNCERTAIN ABOUT DATE
HEADACHE, FULLNESS IN HEAD: MILD
TREMOR: NO TREMOR
PAROXYSMAL SWEATS: NO SWEAT VISIBLE
AUDITORY DISTURBANCES: NOT PRESENT
AGITATION: MODERATELY FIDGETY AND RESTLESS
AGITATION: MODERATELY FIDGETY AND RESTLESS
ORIENTATION AND CLOUDING OF SENSORIUM: DISORIENTED FOR PLACE OR PERSON

## 2024-03-19 ASSESSMENT — COGNITIVE AND FUNCTIONAL STATUS - GENERAL
TOILETING: A LOT
EATING MEALS: A LOT
MOVING TO AND FROM BED TO CHAIR: A LOT
CLIMB 3 TO 5 STEPS WITH RAILING: TOTAL
DAILY ACTIVITIY SCORE: 12
DRESSING REGULAR LOWER BODY CLOTHING: A LOT
MOVING FROM LYING ON BACK TO SITTING ON SIDE OF FLAT BED WITH BEDRAILS: A LITTLE
MOBILITY SCORE: 11
DRESSING REGULAR UPPER BODY CLOTHING: A LOT
TURNING FROM BACK TO SIDE WHILE IN FLAT BAD: A LOT
HELP NEEDED FOR BATHING: A LOT
PERSONAL GROOMING: A LOT
STANDING UP FROM CHAIR USING ARMS: A LOT
WALKING IN HOSPITAL ROOM: TOTAL

## 2024-03-19 ASSESSMENT — PAIN - FUNCTIONAL ASSESSMENT
PAIN_FUNCTIONAL_ASSESSMENT: CPOT (CRITICAL CARE PAIN OBSERVATION TOOL)

## 2024-03-19 ASSESSMENT — PAIN SCALES - GENERAL
PAINLEVEL_OUTOF10: 0 - NO PAIN
PAINLEVEL_OUTOF10: 0 - NO PAIN

## 2024-03-19 NOTE — CARE PLAN
The patient's goals for the shift include      The clinical goals for the shift include ciwa less than 6 hemodynamically stable  Pt decompensated this shift was intubated and palced on precedex, fentayl and propofol for withdrawal. Vital signs reminas stable.

## 2024-03-19 NOTE — PROCEDURES
Endotracheal Intubation Procedure Note    Indication for endotracheal intubation: airway compromise.  Sedation: none. Fentanyl 25mcg given, patient obtunded and apneic  Paralytic: rocuronium.  Equipment:  Glidescope S4 laryngoscope blade and 7.5mm cuffed endotracheal tube.  Cricoid Pressure: yes.  Number of attempts: 1.  ETT location confirmed by by auscultation, ETCO2 monitor, and CXR pending .    Intubation performed by Dr. Carrillo with my supervision. I was present for the entire procedure.    Mayelin Michael, DO

## 2024-03-19 NOTE — SIGNIFICANT EVENT
ICU Attending Summary:     Mr. Villalobos is a 45yo male with history of EtOH abuse with prior DTs without seizure treated with phenobarb taper, prior Margoth Deluca tear, prior afib on metoprolol, gout on PRN colchicine presenting with EtOH withdrawal and abdominal pain with CT showing enterocolitis. Due to severity of withdrawal, he is admitted to ICU for further management.    On 3/19, the patient became more agitated and received an additional dose of valium IV and precedex was increased. He became obtunded, apneic and was not protecting  his airway despite turning off his precedex. Reversal agent contraindicated in setting of EtOH withdrawal. He was intubated for airway protection.     #Acute alcohol intoxication and DTs: EtOH 260 and CIWA score 51 on presentation. S/p  260mg x1 IV phenobarb and is on phenobarb taper. Sedation with propofol, hold benzos given propofol and phenobarb with appropriate. Thiamine 500mg tid for three days and 100mg thereafter.      #Acute respiratory insufficiency: intubated for airway protection, sedation today, attempt awakening trial tomorrow     #History of afib: hold PO metop while NPO, initiate prn IV metoprolol for HR >110     #HTN: likely EtOH withdrawal related, PRN labetalol 5mg for SBP >160     #Acute enterocolitis: benign abdominal exam, supportive care     #Transaminitis: likely alcoholic hepatitis, CT shows hepatic steatosis. Trend LFTs      #HAGMA: likely alcoholic ketosis vs starvation ketosis. Resolved      Lines/tubes: PIVs, ETT 3/19, OGT 3/19, dawson 3/18  Prophylaxis: Lovenox, PPI  Drips: Precedex, propofol, fentanyl  Fluids: PRN  Oxygen: vent  Nutrition: initiate tube feeds  Restraints: bilateral wrist restraints due to pulling at lines/tubes and danger to self. Will reassess regularly  Code status: presumed full code, declined to name NOK. SW c/s to find family  Dispo: ICU

## 2024-03-19 NOTE — NURSING NOTE
0900- pt noted to have irregular snoring resp, difficult to arouse, call to Dr Michael and Dr Vora, pt pLAced on 100% NRB  0915- Dr Michael, Dr Vora and Dr Carrillo at [facility], also resp therapist. Pt remains unresponsive resp shallow with apneic periods, pt will be intubated.  0914- Fentanyl 25mcg and Roccuronium 70mg iv given , pt bagged with 100% o2 via mask.   0915 pt intubated at bedside per Dr Carrillo 7.5 ETT secured at 26 lip ,   0925 OG plced 60 at lip secured in place , clear secretion return, pt starting to move and fight vent Propofol drip begun as ordered.   0930- cxr done propofol titrated for sedation.   0935 ett retaped 24 at lip per RT  0944 pt remians agitated Dr Michael at [facility], pt started on versed drip at this time. Precedex and propofol titrated for sedation.

## 2024-03-19 NOTE — PROGRESS NOTES
Subjective:  Patient was increasingly agitated this morning and failed to protect his airway after receiving valium and precedex. He was intubated for airway protection as he was apneic.    Admission history:  Neo Villalobos is a 44 y.o. male with a past medical history of alcohol use disorder with a history of DTs without seizure, Margoth Deluca tear, gout, paroxysmal A-fib, COPD, hypertension presents to [place] ED with concerns for alcohol withdrawal and abdominal pain.  In the ED, patient's EtOH was 260, CIWA 51, , , bicarb 15.  He received 20 mg of Valium, 10 mg Ativan and started on Precedex GGT. patient was agitated and refused phenobarbital although based on chart review, he has received phenobarbital multiple times in the past.  CT A/P showed enterocolitis, hepatic steatosis and colonic diverticulosis.  Due to extreme agitation and danger to self with pulling lines patient was placed on soft restraints.    Past medical history: As above  Past surgical history: Hip replacement 2021  Past social history: Alcohol abuse disorder (per chart review, a fifth vodka per day with multiple relapses after quitting), former smoker per chart review  Past family history: Mother has hypertension, diabetes and heart disease    Review of systems:  10 point review of systems performed and negative except as stated above.    Physical Exam:  General: Intubated, sedated   HEENT:  Normocephalic, atraumatic, mucus membranes moist.   Neck:  Trachea midline.  No JVD.    Chest:  Clear to auscultation bilaterally. No wheezes, rales, or rhonchi.  CV:  Regular rate and rhythm.  Positive S1/S2. No murmur, no gallops, no rubs  Abdomen: Bowel sounds present in all four quadrants, abdomen is soft, non-tender, non-distended.  Extremities:  No lower extremity edema or cyanosis.   Neurological: Intubated, sedated  Skin:  Warm and dry.    Daily progress:   3/18: Phenobarb 260 mg ordered since load was not given in the ED.  Acute  urinary retention, Dawson catheter inserted.  Increase D5 normal saline rate to 125 cc/h.  Patient continues to be agitated and yelling intermittently, danger to self with pulling lines, soft restraints renewed.  RFP and magnesium levels twice daily.  3/19: Pt became more agitated this morning and received an extra dose of valium and precedex was up titrated. He became apneic and was obtunded and was not protecting his airway. He was intubated for airway protection. Valium held. Continue phenobarb. Sedation with propofol.     Assessment + Plan:     Neuro:  #Acute alcohol intoxication and DTs  #Alcohol withdrawal  #Agitation  #Altered mental status  -Precedex GGT initiated in the ED, wean as tolerated  -Phenobarb taper ordered  -Valium discontinued   -500 mg thiamine IV 3 times daily for 3 days then transition to p.o. 100 mg daily  -Folic acid IV  -NPO due to altered mental status  -Due to extreme agitation and danger to self with pulling lines patient was placed on soft restraints.    CV:  #History of A-fib  -Holding home p.o. metoprolol while NPO, IV Lopressor as needed    Respiratory:  #Acute respiratory insufficiency  Patient was intubated 3/19 for airway protection. Will attempt awakening trial tomorrow    GI:  #Transaminitis, downtrending  #Acute enterocolitis  -Secondary to alcoholic hepatitis/steatosis.  Continue to monitor  -PPI as tolerated    Endo:  No acute abnormalities    Renal:  #Alcoholic ketoacidosis  #High anion gap metabolic acidosis likely secondary to above  -Likely secondary to alcoholic ketosis or starvation ketosis.    -Thiamine 500 mg IV 3 times daily for 3 days then transition to oral    Hematological:  No acute abnormalities    ID:  No acute abnormalities    Vent/O2: Intubated 3/19  Lines/Devices: PIV, dawson  Drips: Precedex, propofol, fentanyl   ATBx: None  Fluids: none  Diet: N.p.o.  PPX: Protonix, Lovenox  Code status: presumed full code, declined to name MICHA. PHILOMENA c/s to find  family  Dispo: ICU

## 2024-03-19 NOTE — PROGRESS NOTES
Social work following for ETOH resources. Patient intubated today. Will follow up once extubated. PHILOMENA located MICHA [place] phone number in Care Everywhere. PHILOMENA put Neo Villalobos's phone number in patients contact information and updated team.  will continue to follow. Message with questions.  HANNAH Arce

## 2024-03-19 NOTE — CONSULTS
Nutrition Initial Assessment:     Reason for Assessment  Reason for Assessment: Provider consult order    Visit Reason: ETOH withdrawal;  abd pain  Recommendation(s):  If pt's gut is functioning recommend TF of Vital high protein, start at 20 ml hr and increase 10 ml every 8 hours, as tolerated to goal rate of 45 ml hr 24. This will provide 1080 albino and 94 gm pro with 902 ml fliud toward fluid needs. Water flushes with meds only. With propofol running at 32.1 ml hr (847 albino), pt is getting a total of 1927 albino per day. Monitor phos and mag for signs of re feeding, Monitor labs and toleration of feeding, Monitor for any needed changes in water flushes         Nutrition Assessment    Nutrition History  Food and Nutrient History: Pt was agitated and scoring high on CIWA.  He was intubated this morning to protect his airway.  A TF was initiated.  Vitamin/Herbal Supplement Use: multivit; folic acid; thiamine      Per Flowsheet Percent Meal intake:    Dietary Orders (From admission, onward)       Start     Ordered    03/19/24 1056  Enteral feeding with NPO 45; (water flushes with meds only)  Diet effective now        Comments: Start TF at 20 ml hr  and increase, as tolerated, 10 ml every 8 hours to goal rate of 45 ml hr 24   Question Answer Comment   Tube feeding formula: Vital High Protein    Tube feeding continuous rate (mL/hr): 45    Tube feeding flush (mL):  water flushes with meds only       03/19/24 1057                     Results from last 7 days   Lab Units 03/19/24  0507 03/18/24  1811 03/18/24  0544 03/17/24  1723 03/17/24  1248   GLUCOSE mg/dL 128* 101* 119*   < >  --    SODIUM mmol/L 135* 136 133*   < >  --    POTASSIUM mmol/L 3.5 3.9 4.0   < > 4.0   CHLORIDE mmol/L 102 102 97*   < >  --    CO2 mmol/L 23 21 15*   < >  --    BUN mg/dL 4* 2* 11   < >  --    CREATININE mg/dL 0.70 0.72 0.81   < >  --    EGFR mL/min/1.73m*2 >90 >90 >90   < >  --    CALCIUM mg/dL 7.3* 7.1* 7.3*   < >  --    PHOSPHORUS mg/dL 2.4*  "1.9*  --   --  3.1   MAGNESIUM mg/dL  --  1.53*  --   --  1.64    < > = values in this interval not displayed.     No results found for: \"HGBA1C\"  Results from last 7 days   Lab Units 03/18/24  1926 03/18/24  0434 03/18/24  0040 03/17/24 2019   POCT GLUCOSE mg/dL 146* 123* 96 97     GI per flowsheet:  Gastrointestinal  Gastrointestinal (WDL): Within Defined Limits  Abdomen Inspection: Rounded  Abdominal Tenderness: Nontender  Bowel Sounds: All quadrants  Bowel Sounds (All Quadrants): Present  Gastrointestinal Symptoms: Other (Comment) (C/O RUQ PAIN)  Last bowel movement documented:    PMH: enterocolitis; ETOH abuse; agitated; prior DT's; A FIB; Margoth Deluca tear; Gout  Allergies: Clonidine, Sulfamethoxazole-trimethoprim, and Gabapentin enacarbil     Anthropometrics:  Height: 175.3 cm (5' 9.02\")  Weight: 109 kg (240 lb 8.4 oz)  BMI (Calculated): 35.5  IBW: 72 kg  %IBW: 150 %              Estimated Nutritional Needs:  Method for Estimating Needs: 3017-5512   26-30 albino kg of IBW    Method for Estimating Needs:   1.1-1.5 gm kg of IBW    Method for Estimating Needs: 6927-3298   20-30 ml kg of IBW    Nutrition Focused Physical Findings:   Orbital Fat Pads: Defer (not appropriate)          Pain Score: 0 - No pain  Critical-Care Pain Observation Score:  [0]      Nutrition Diagnosis        Patient has Nutrition Diagnosis: Yes  New  Nutrition Diagnosis 1: Inadequate oral intake  Related to (1): decreased ability to consume sufficient energy  As Evidenced by (1): pt is intubated and a TF is being initiated            Nutrition Interventions/Recommendations   Interventions        Individualized Nutrition Prescription Provided for : If pt's gut is functioning recommend TF of Vital high protein,  start at 20 ml hr and increase 10 ml every 8 hours, as tolerated to goal rate of 45 ml hr 24.  This will provide 1080 albino and 94 gm pro with 902 ml fliud toward fluid needs.  Water flushes with meds only.  With propofol running " at 32.1 ml hr (847 albino),  pt is getting a total of 1927 albino per day.  Monitor phos and mag for signs of re feeding,  Monitor labs and toleration of feeding,  Monitor for any needed changes in water flushes    Nutrition Monitoring and Evaluation   Biochemical Data, Medical Tests and Procedures  Monitoring and Evaluation Plan: Electrolyte/renal panel, Glucose/endocrine profile  Food/Nutrient Related History Monitoring  Monitoring and Evaluation Plan: Enteral and parenteral nutrition intake  Enteral and Parenteral Nutrition Intake: Enteral nutrition formula/solution, Enteral nutrition intake    Progress towards goals: Partially Met    Education Documentation  No documentation found.         Time Spent (min): 45 minutes  Last Date of Nutrition Visit: 03/19/24  Nutrition Follow-Up Needed?: 3-5 days  Follow up Comment: 3/22  MZ

## 2024-03-20 LAB
ALBUMIN SERPL BCP-MCNC: 3 G/DL (ref 3.4–5)
ALP SERPL-CCNC: 84 U/L (ref 33–120)
ALT SERPL W P-5'-P-CCNC: 153 U/L (ref 10–52)
ANION GAP SERPL CALC-SCNC: 12 MMOL/L (ref 10–20)
AST SERPL W P-5'-P-CCNC: 163 U/L (ref 9–39)
BASOPHILS # BLD AUTO: 0.02 X10*3/UL (ref 0–0.1)
BASOPHILS NFR BLD AUTO: 0.5 %
BILIRUB SERPL-MCNC: 1.1 MG/DL (ref 0–1.2)
BUN SERPL-MCNC: 3 MG/DL (ref 6–23)
CALCIUM SERPL-MCNC: 7 MG/DL (ref 8.6–10.3)
CHLORIDE SERPL-SCNC: 106 MMOL/L (ref 98–107)
CK SERPL-CCNC: 69 U/L (ref 0–325)
CO2 SERPL-SCNC: 27 MMOL/L (ref 21–32)
CREAT SERPL-MCNC: 0.67 MG/DL (ref 0.5–1.3)
EGFRCR SERPLBLD CKD-EPI 2021: >90 ML/MIN/1.73M*2
EOSINOPHIL # BLD AUTO: 0.13 X10*3/UL (ref 0–0.7)
EOSINOPHIL NFR BLD AUTO: 3.4 %
ERYTHROCYTE [DISTWIDTH] IN BLOOD BY AUTOMATED COUNT: 12.9 % (ref 11.5–14.5)
GLUCOSE BLD MANUAL STRIP-MCNC: 120 MG/DL (ref 74–99)
GLUCOSE BLD MANUAL STRIP-MCNC: 66 MG/DL (ref 74–99)
GLUCOSE BLD MANUAL STRIP-MCNC: 71 MG/DL (ref 74–99)
GLUCOSE BLD MANUAL STRIP-MCNC: 97 MG/DL (ref 74–99)
GLUCOSE SERPL-MCNC: 113 MG/DL (ref 74–99)
HCT VFR BLD AUTO: 38 % (ref 41–52)
HGB BLD-MCNC: 12.7 G/DL (ref 13.5–17.5)
IMM GRANULOCYTES # BLD AUTO: 0.01 X10*3/UL (ref 0–0.7)
IMM GRANULOCYTES NFR BLD AUTO: 0.3 % (ref 0–0.9)
LYMPHOCYTES # BLD AUTO: 0.8 X10*3/UL (ref 1.2–4.8)
LYMPHOCYTES NFR BLD AUTO: 20.9 %
MCH RBC QN AUTO: 34.3 PG (ref 26–34)
MCHC RBC AUTO-ENTMCNC: 33.4 G/DL (ref 32–36)
MCV RBC AUTO: 103 FL (ref 80–100)
MONOCYTES # BLD AUTO: 0.23 X10*3/UL (ref 0.1–1)
MONOCYTES NFR BLD AUTO: 6 %
NEUTROPHILS # BLD AUTO: 2.63 X10*3/UL (ref 1.2–7.7)
NEUTROPHILS NFR BLD AUTO: 68.9 %
NRBC BLD-RTO: 0 /100 WBCS (ref 0–0)
PLATELET # BLD AUTO: 87 X10*3/UL (ref 150–450)
POTASSIUM SERPL-SCNC: 3.1 MMOL/L (ref 3.5–5.3)
PROT SERPL-MCNC: 4.8 G/DL (ref 6.4–8.2)
RBC # BLD AUTO: 3.7 X10*6/UL (ref 4.5–5.9)
SODIUM SERPL-SCNC: 142 MMOL/L (ref 136–145)
TRIGL SERPL-MCNC: 121 MG/DL (ref 0–149)
WBC # BLD AUTO: 3.8 X10*3/UL (ref 4.4–11.3)

## 2024-03-20 PROCEDURE — C9113 INJ PANTOPRAZOLE SODIUM, VIA: HCPCS

## 2024-03-20 PROCEDURE — 2020000001 HC ICU ROOM DAILY

## 2024-03-20 PROCEDURE — 2500000004 HC RX 250 GENERAL PHARMACY W/ HCPCS (ALT 636 FOR OP/ED): Performed by: INTERNAL MEDICINE

## 2024-03-20 PROCEDURE — 2500000004 HC RX 250 GENERAL PHARMACY W/ HCPCS (ALT 636 FOR OP/ED)

## 2024-03-20 PROCEDURE — 99291 CRITICAL CARE FIRST HOUR: CPT

## 2024-03-20 PROCEDURE — 2500000005 HC RX 250 GENERAL PHARMACY W/O HCPCS: Performed by: STUDENT IN AN ORGANIZED HEALTH CARE EDUCATION/TRAINING PROGRAM

## 2024-03-20 PROCEDURE — 2500000001 HC RX 250 WO HCPCS SELF ADMINISTERED DRUGS (ALT 637 FOR MEDICARE OP)

## 2024-03-20 PROCEDURE — 94003 VENT MGMT INPAT SUBQ DAY: CPT

## 2024-03-20 PROCEDURE — 85025 COMPLETE CBC W/AUTO DIFF WBC: CPT

## 2024-03-20 PROCEDURE — 80053 COMPREHEN METABOLIC PANEL: CPT

## 2024-03-20 PROCEDURE — 82947 ASSAY GLUCOSE BLOOD QUANT: CPT

## 2024-03-20 PROCEDURE — 36415 COLL VENOUS BLD VENIPUNCTURE: CPT

## 2024-03-20 PROCEDURE — 82550 ASSAY OF CK (CPK): CPT

## 2024-03-20 PROCEDURE — 84478 ASSAY OF TRIGLYCERIDES: CPT

## 2024-03-20 RX ORDER — MIDAZOLAM HYDROCHLORIDE 1 MG/ML
INJECTION, SOLUTION INTRAMUSCULAR; INTRAVENOUS
Status: DISPENSED
Start: 2024-03-20 | End: 2024-03-21

## 2024-03-20 RX ORDER — NOREPINEPHRINE BITARTRATE 0.03 MG/ML
.01-1 INJECTION, SOLUTION INTRAVENOUS CONTINUOUS
Status: DISCONTINUED | OUTPATIENT
Start: 2024-03-20 | End: 2024-03-22

## 2024-03-20 RX ORDER — SENNOSIDES 8.8 MG/5ML
5 LIQUID ORAL 2 TIMES DAILY
Status: DISCONTINUED | OUTPATIENT
Start: 2024-03-20 | End: 2024-03-23 | Stop reason: HOSPADM

## 2024-03-20 RX ORDER — DEXTROSE 50 % IN WATER (D50W) INTRAVENOUS SYRINGE
25
Status: DISCONTINUED | OUTPATIENT
Start: 2024-03-20 | End: 2024-03-23 | Stop reason: HOSPADM

## 2024-03-20 RX ORDER — DIAZEPAM 5 MG/ML
10 INJECTION, SOLUTION INTRAMUSCULAR; INTRAVENOUS EVERY 2 HOUR PRN
Status: DISCONTINUED | OUTPATIENT
Start: 2024-03-20 | End: 2024-03-22

## 2024-03-20 RX ORDER — MIDAZOLAM HYDROCHLORIDE 1 MG/ML
4 INJECTION, SOLUTION INTRAMUSCULAR; INTRAVENOUS ONCE
Status: COMPLETED | OUTPATIENT
Start: 2024-03-20 | End: 2024-03-20

## 2024-03-20 RX ORDER — MIDAZOLAM HYDROCHLORIDE 1 MG/ML
INJECTION, SOLUTION INTRAMUSCULAR; INTRAVENOUS
Status: COMPLETED
Start: 2024-03-20 | End: 2024-03-20

## 2024-03-20 RX ORDER — POTASSIUM CHLORIDE 14.9 MG/ML
20 INJECTION INTRAVENOUS ONCE
Status: COMPLETED | OUTPATIENT
Start: 2024-03-20 | End: 2024-03-20

## 2024-03-20 RX ORDER — DEXTROSE 50 % IN WATER (D50W) INTRAVENOUS SYRINGE
12.5
Status: DISCONTINUED | OUTPATIENT
Start: 2024-03-20 | End: 2024-03-23 | Stop reason: HOSPADM

## 2024-03-20 RX ORDER — PHENOBARBITAL SODIUM 65 MG/ML
260 INJECTION, SOLUTION INTRAMUSCULAR; INTRAVENOUS ONCE
Status: COMPLETED | OUTPATIENT
Start: 2024-03-20 | End: 2024-03-20

## 2024-03-20 RX ORDER — POLYETHYLENE GLYCOL 3350 17 G/17G
17 POWDER, FOR SOLUTION ORAL 2 TIMES DAILY
Status: DISCONTINUED | OUTPATIENT
Start: 2024-03-20 | End: 2024-03-23 | Stop reason: HOSPADM

## 2024-03-20 RX ORDER — SENNOSIDES 8.6 MG/1
1 TABLET ORAL 2 TIMES DAILY
Status: DISCONTINUED | OUTPATIENT
Start: 2024-03-20 | End: 2024-03-20

## 2024-03-20 RX ORDER — PROPOFOL 10 MG/ML
0-50 INJECTION, EMULSION INTRAVENOUS CONTINUOUS
Status: DISCONTINUED | OUTPATIENT
Start: 2024-03-20 | End: 2024-03-22

## 2024-03-20 RX ORDER — DEXMEDETOMIDINE HYDROCHLORIDE 4 UG/ML
.1-1.5 INJECTION, SOLUTION INTRAVENOUS CONTINUOUS
Status: DISCONTINUED | OUTPATIENT
Start: 2024-03-20 | End: 2024-03-23

## 2024-03-20 RX ADMIN — ALLOPURINOL 300 MG: 100 TABLET ORAL at 09:05

## 2024-03-20 RX ADMIN — PROPOFOL 40 MCG/KG/MIN: 10 INJECTION, EMULSION INTRAVENOUS at 20:56

## 2024-03-20 RX ADMIN — THIAMINE HYDROCHLORIDE 500 MG: 100 INJECTION, SOLUTION INTRAMUSCULAR; INTRAVENOUS at 11:17

## 2024-03-20 RX ADMIN — SODIUM CHLORIDE, POTASSIUM CHLORIDE, SODIUM LACTATE AND CALCIUM CHLORIDE 1000 ML: 600; 310; 30; 20 INJECTION, SOLUTION INTRAVENOUS at 04:12

## 2024-03-20 RX ADMIN — THIAMINE HYDROCHLORIDE 500 MG: 100 INJECTION, SOLUTION INTRAMUSCULAR; INTRAVENOUS at 00:53

## 2024-03-20 RX ADMIN — PROPOFOL 40 MCG/KG/MIN: 10 INJECTION, EMULSION INTRAVENOUS at 16:12

## 2024-03-20 RX ADMIN — MIDAZOLAM HYDROCHLORIDE 4 MG: 1 INJECTION, SOLUTION INTRAMUSCULAR; INTRAVENOUS at 13:42

## 2024-03-20 RX ADMIN — PANTOPRAZOLE SODIUM 40 MG: 40 INJECTION, POWDER, FOR SOLUTION INTRAVENOUS at 09:05

## 2024-03-20 RX ADMIN — PROPOFOL 40 MCG/KG/MIN: 10 INJECTION, EMULSION INTRAVENOUS at 09:03

## 2024-03-20 RX ADMIN — PROPOFOL 50 MCG/KG/MIN: 10 INJECTION, EMULSION INTRAVENOUS at 05:54

## 2024-03-20 RX ADMIN — Medication 1 TABLET: at 09:04

## 2024-03-20 RX ADMIN — PHENOBARBITAL SODIUM 65 MG: 65 INJECTION INTRAMUSCULAR at 16:12

## 2024-03-20 RX ADMIN — PROPOFOL 40 MCG/KG/MIN: 10 INJECTION, EMULSION INTRAVENOUS at 12:47

## 2024-03-20 RX ADMIN — POLYETHYLENE GLYCOL 3350 17 G: 17 POWDER, FOR SOLUTION ORAL at 09:05

## 2024-03-20 RX ADMIN — Medication 150 MCG/HR: at 06:25

## 2024-03-20 RX ADMIN — Medication: at 08:00

## 2024-03-20 RX ADMIN — SENNOSIDES 5 ML: 8.8 LIQUID ORAL at 22:16

## 2024-03-20 RX ADMIN — PROPOFOL 50 MCG/KG/MIN: 10 INJECTION, EMULSION INTRAVENOUS at 02:57

## 2024-03-20 RX ADMIN — POTASSIUM CHLORIDE 20 MEQ: 14.9 INJECTION, SOLUTION INTRAVENOUS at 07:27

## 2024-03-20 RX ADMIN — DEXMEDETOMIDINE HYDROCHLORIDE 1.05 MCG/KG/HR: 4 INJECTION, SOLUTION INTRAVENOUS at 04:12

## 2024-03-20 RX ADMIN — Medication 100 MCG/HR: at 14:54

## 2024-03-20 RX ADMIN — PHENOBARBITAL SODIUM 260 MG: 65 INJECTION INTRAMUSCULAR at 10:34

## 2024-03-20 RX ADMIN — Medication 175 MCG/HR: at 00:47

## 2024-03-20 RX ADMIN — DEXMEDETOMIDINE HYDROCHLORIDE 1.05 MCG/KG/HR: 4 INJECTION, SOLUTION INTRAVENOUS at 00:47

## 2024-03-20 RX ADMIN — DEXMEDETOMIDINE HYDROCHLORIDE 0.6 MCG/KG/HR: 4 INJECTION, SOLUTION INTRAVENOUS at 17:27

## 2024-03-20 RX ADMIN — DEXMEDETOMIDINE HYDROCHLORIDE 1 MCG/KG/HR: 4 INJECTION, SOLUTION INTRAVENOUS at 12:41

## 2024-03-20 RX ADMIN — ENOXAPARIN SODIUM 40 MG: 40 INJECTION SUBCUTANEOUS at 16:12

## 2024-03-20 RX ADMIN — DEXMEDETOMIDINE HYDROCHLORIDE 1 MCG/KG/HR: 4 INJECTION, SOLUTION INTRAVENOUS at 07:29

## 2024-03-20 RX ADMIN — SENNOSIDES 5 ML: 8.8 LIQUID ORAL at 11:17

## 2024-03-20 RX ADMIN — PHENOBARBITAL SODIUM 260 MG: 65 INJECTION INTRAMUSCULAR; INTRAVENOUS at 15:20

## 2024-03-20 RX ADMIN — MIDAZOLAM 4 MG: 1 INJECTION INTRAMUSCULAR; INTRAVENOUS at 13:42

## 2024-03-20 RX ADMIN — FOLIC ACID 1 MG: 1 TABLET ORAL at 09:05

## 2024-03-20 RX ADMIN — PHENOBARBITAL SODIUM 65 MG: 65 INJECTION INTRAMUSCULAR at 08:24

## 2024-03-20 RX ADMIN — PHENOBARBITAL SODIUM 260 MG: 65 INJECTION INTRAMUSCULAR at 12:55

## 2024-03-20 RX ADMIN — POLYETHYLENE GLYCOL 3350 17 G: 17 POWDER, FOR SOLUTION ORAL at 22:16

## 2024-03-20 ASSESSMENT — LIFESTYLE VARIABLES
AUDITORY DISTURBANCES: NOT PRESENT
AGITATION: NORMAL ACTIVITY
ANXIETY: NO ANXIETY, AT EASE
AGITATION: NORMAL ACTIVITY
TOTAL SCORE: 0
TOTAL SCORE: 12
HEADACHE, FULLNESS IN HEAD: NOT PRESENT
ORIENTATION AND CLOUDING OF SENSORIUM: ORIENTED AND CAN DO SERIAL ADDITIONS
VISUAL DISTURBANCES: NOT PRESENT
AGITATION: 6
VISUAL DISTURBANCES: NOT PRESENT
PAROXYSMAL SWEATS: NO SWEAT VISIBLE
PAROXYSMAL SWEATS: NO SWEAT VISIBLE
ANXIETY: NO ANXIETY, AT EASE
NAUSEA AND VOMITING: NO NAUSEA AND NO VOMITING
AUDITORY DISTURBANCES: NOT PRESENT
NAUSEA AND VOMITING: NO NAUSEA AND NO VOMITING
ORIENTATION AND CLOUDING OF SENSORIUM: DISORIENTED FOR DATA BY NO MORE THAN 2 CALENDAR DAYS
ORIENTATION AND CLOUDING OF SENSORIUM: ORIENTED AND CAN DO SERIAL ADDITIONS
VISUAL DISTURBANCES: NOT PRESENT
AUDITORY DISTURBANCES: NOT PRESENT
HEADACHE, FULLNESS IN HEAD: NOT PRESENT
TREMOR: NO TREMOR
TREMOR: NO TREMOR
HEADACHE, FULLNESS IN HEAD: NOT PRESENT
TREMOR: NO TREMOR
TOTAL SCORE: 0
NAUSEA AND VOMITING: NO NAUSEA AND NO VOMITING
ANXIETY: MODERATELY ANXIOUS, OR GUARDED, SO ANXIETY IS INFERRED
PAROXYSMAL SWEATS: NO SWEAT VISIBLE

## 2024-03-20 ASSESSMENT — COGNITIVE AND FUNCTIONAL STATUS - GENERAL
TURNING FROM BACK TO SIDE WHILE IN FLAT BAD: A LOT
TOILETING: TOTAL
HELP NEEDED FOR BATHING: TOTAL
DRESSING REGULAR UPPER BODY CLOTHING: A LOT
MOVING TO AND FROM BED TO CHAIR: TOTAL
STANDING UP FROM CHAIR USING ARMS: TOTAL
PERSONAL GROOMING: TOTAL
MOVING FROM LYING ON BACK TO SITTING ON SIDE OF FLAT BED WITH BEDRAILS: A LOT
HELP NEEDED FOR BATHING: A LOT
STANDING UP FROM CHAIR USING ARMS: TOTAL
TOILETING: TOTAL
EATING MEALS: TOTAL
DRESSING REGULAR LOWER BODY CLOTHING: TOTAL
CLIMB 3 TO 5 STEPS WITH RAILING: TOTAL
TURNING FROM BACK TO SIDE WHILE IN FLAT BAD: A LOT
MOVING TO AND FROM BED TO CHAIR: TOTAL
DRESSING REGULAR UPPER BODY CLOTHING: TOTAL
DAILY ACTIVITIY SCORE: 9
MOBILITY SCORE: 8
WALKING IN HOSPITAL ROOM: TOTAL
DRESSING REGULAR LOWER BODY CLOTHING: A LOT
MOBILITY SCORE: 8
DAILY ACTIVITIY SCORE: 6
CLIMB 3 TO 5 STEPS WITH RAILING: TOTAL
PERSONAL GROOMING: TOTAL
EATING MEALS: TOTAL
WALKING IN HOSPITAL ROOM: TOTAL
MOVING FROM LYING ON BACK TO SITTING ON SIDE OF FLAT BED WITH BEDRAILS: A LOT

## 2024-03-20 ASSESSMENT — PAIN - FUNCTIONAL ASSESSMENT

## 2024-03-20 NOTE — PROGRESS NOTES
Subjective:  Patient seen at bedside today, still intubated and intermittently agitated.  Continue to monitor.    Admission history:  Neo Villalobos is a 44 y.o. male with a past medical history of alcohol use disorder with a history of DTs without seizure, Margoth Deluca tear, gout, paroxysmal A-fib, COPD, hypertension presents to [place] ED with concerns for alcohol withdrawal and abdominal pain.  In the ED, patient's EtOH was 260, CIWA 51, , , bicarb 15.  He received 20 mg of Valium, 10 mg Ativan and started on Precedex GGT. patient was agitated and refused phenobarbital although based on chart review, he has received phenobarbital multiple times in the past.  CT A/P showed enterocolitis, hepatic steatosis and colonic diverticulosis.  Due to extreme agitation and danger to self with pulling lines patient was placed on soft restraints.    Past medical history: As above  Past surgical history: Hip replacement 2021  Past social history: Alcohol abuse disorder (per chart review, a fifth vodka per day with multiple relapses after quitting), former smoker per chart review  Past family history: Mother has hypertension, diabetes and heart disease    Review of systems:  10 point review of systems performed and negative except as stated above.    Physical Exam:  General: Intubated, sedated   HEENT:  Normocephalic, atraumatic, mucus membranes moist.   Neck:  Trachea midline.  No JVD.    Chest:  Clear to auscultation bilaterally. No wheezes, rales, or rhonchi.  CV:  Regular rate and rhythm.  Positive S1/S2. No murmur, no gallops, no rubs  Abdomen: Bowel sounds present in all four quadrants, abdomen is soft, non-tender, non-distended.  Extremities:  No lower extremity edema or cyanosis.   Neurological: Intubated, sedated  Skin:  Warm and dry.    Daily progress:   3/18: Phenobarb 260 mg ordered since load was not given in the ED.  Acute urinary retention, Emanuel catheter inserted.  Increase D5 normal saline rate to  125 cc/h.  Patient continues to be agitated and yelling intermittently, danger to self with pulling lines, soft restraints renewed.  RFP and magnesium levels twice daily.  3/19: Pt became more agitated this morning and received an extra dose of valium and precedex was up titrated. He became apneic and was obtunded and was not protecting his airway. He was intubated for airway protection. Valium held. Continue phenobarb. Sedation with propofol.  3/20: Patient intermittently agitated and poses a danger to self with pulling lines, soft restraints ordered.  Ordered 260 mg phenobarbital x3.  Versed 4 mg once.  Plan to extubate in the next 24 to 48 hours.  Electrolytes replenished this morning.  Patient was given 1 L LR overnight due to soft pressures, continue to monitor.  Patient not having much urine output today we will continue to monitor this afternoon.  Transition to oral thiamine tomorrow if able.    Assessment + Plan:     Neuro:  #Acute alcohol intoxication and DTs  #Alcohol withdrawal  #Agitation  #Altered mental status  -Precedex GGT initiated in the ED, wean as tolerated  -Continue phenobarb taper   -Valium as needed  -500 mg thiamine IV 3 times daily for 3 days then transition to p.o. 100 mg daily  -Folic acid IV  -NPO due to altered mental status  -Due to extreme agitation and danger to self with pulling lines patient was placed on soft restraints.    CV:  #History of A-fib  -Holding home p.o. metoprolol while NPO, IV Lopressor as needed    Respiratory:  #Acute respiratory insufficiency  Patient was intubated 3/19 for airway protection. Will attempt awakening trial tomorrow    GI:  #Transaminitis, downtrending  #Acute enterocolitis  -Secondary to alcoholic hepatitis/steatosis.  Continue to monitor  -PPI as tolerated    Endo:  No acute abnormalities    Renal:  #Alcoholic ketoacidosis, resolved  #High anion gap metabolic acidosis likely secondary to above  -Likely secondary to alcoholic ketosis or starvation  ketosis.    -Thiamine 500 mg IV 3 times daily for 3 days then transition to oral    Hematological:  No acute abnormalities    ID:  No acute abnormalities    Vent/O2: Intubated 3/19  Lines/Devices: PIV, dawson  Drips: Precedex, propofol, fentanyl   ATBx: None  Fluids: none  Diet: N.p.o.  PPX: Protonix, Lovenox  Code status: presumed full code, declined to name NOK. SW c/s to find family  Dispo: ICU

## 2024-03-20 NOTE — CARE PLAN
The patient's goals for the shift include  : unable to assess, pt intubated and sedated at this time. Best practice nursing performed in cooperation with ICU team.     The clinical goals for the shift include remain hemodynamically stable and pt safety will be maintained      Problem: Skin  Goal: Promote/optimize nutrition  Outcome: Progressing  Flowsheets (Taken 3/19/2024 2018)  Promote/optimize nutrition:   Assist with feeding   Monitor/record intake including meals  Note: Tube feed running      Problem: Pain  Goal: My pain/discomfort is manageable  Outcome: Progressing  Flowsheets (Taken 3/19/2024 2018)  Resident's pain/discomfort is manageable:   Include resident/family/caregiver in decisions related to pain management   Administer pain medication prior to activities that may trigger pain   Offer non-pharmacological pain management interventions   Identify and avoid pain triggers     Problem: Daily Care  Goal: Daily care needs are met  Outcome: Progressing  Flowsheets (Taken 3/19/2024 2018)  Daily care needs are met:   Assist patient with activities of daily living as needed   Provide mouth care   Assess skin integrity/risk for skin breakdown     Problem: Resident has compromised skin integrity  Goal: I will maintain or improve skin integrity  Outcome: Progressing  Note: Q2h turns

## 2024-03-21 LAB
ALBUMIN SERPL BCP-MCNC: 3.1 G/DL (ref 3.4–5)
ALP SERPL-CCNC: 93 U/L (ref 33–120)
ALT SERPL W P-5'-P-CCNC: 132 U/L (ref 10–52)
ANION GAP SERPL CALC-SCNC: 14 MMOL/L (ref 10–20)
AST SERPL W P-5'-P-CCNC: 121 U/L (ref 9–39)
BILIRUB SERPL-MCNC: 1.2 MG/DL (ref 0–1.2)
BUN SERPL-MCNC: 7 MG/DL (ref 6–23)
CALCIUM SERPL-MCNC: 7.1 MG/DL (ref 8.6–10.3)
CHLORIDE SERPL-SCNC: 106 MMOL/L (ref 98–107)
CK SERPL-CCNC: 144 U/L (ref 0–325)
CK SERPL-CCNC: 50 U/L (ref 0–325)
CO2 SERPL-SCNC: 21 MMOL/L (ref 21–32)
CREAT SERPL-MCNC: 0.84 MG/DL (ref 0.5–1.3)
EGFRCR SERPLBLD CKD-EPI 2021: >90 ML/MIN/1.73M*2
ERYTHROCYTE [DISTWIDTH] IN BLOOD BY AUTOMATED COUNT: 13.2 % (ref 11.5–14.5)
GLUCOSE BLD MANUAL STRIP-MCNC: 102 MG/DL (ref 74–99)
GLUCOSE BLD MANUAL STRIP-MCNC: 111 MG/DL (ref 74–99)
GLUCOSE BLD MANUAL STRIP-MCNC: 92 MG/DL (ref 74–99)
GLUCOSE SERPL-MCNC: 96 MG/DL (ref 74–99)
HCT VFR BLD AUTO: 41.5 % (ref 41–52)
HGB BLD-MCNC: 13.7 G/DL (ref 13.5–17.5)
MCH RBC QN AUTO: 35 PG (ref 26–34)
MCHC RBC AUTO-ENTMCNC: 33 G/DL (ref 32–36)
MCV RBC AUTO: 106 FL (ref 80–100)
NRBC BLD-RTO: 0 /100 WBCS (ref 0–0)
PLATELET # BLD AUTO: 92 X10*3/UL (ref 150–450)
POTASSIUM SERPL-SCNC: 3.8 MMOL/L (ref 3.5–5.3)
PROT SERPL-MCNC: 5.2 G/DL (ref 6.4–8.2)
RBC # BLD AUTO: 3.91 X10*6/UL (ref 4.5–5.9)
SODIUM SERPL-SCNC: 137 MMOL/L (ref 136–145)
TRIGL SERPL-MCNC: 82 MG/DL (ref 0–149)
WBC # BLD AUTO: 4.4 X10*3/UL (ref 4.4–11.3)

## 2024-03-21 PROCEDURE — 36415 COLL VENOUS BLD VENIPUNCTURE: CPT

## 2024-03-21 PROCEDURE — 80053 COMPREHEN METABOLIC PANEL: CPT

## 2024-03-21 PROCEDURE — C9113 INJ PANTOPRAZOLE SODIUM, VIA: HCPCS

## 2024-03-21 PROCEDURE — 94003 VENT MGMT INPAT SUBQ DAY: CPT

## 2024-03-21 PROCEDURE — 2500000004 HC RX 250 GENERAL PHARMACY W/ HCPCS (ALT 636 FOR OP/ED)

## 2024-03-21 PROCEDURE — 2500000001 HC RX 250 WO HCPCS SELF ADMINISTERED DRUGS (ALT 637 FOR MEDICARE OP)

## 2024-03-21 PROCEDURE — 82947 ASSAY GLUCOSE BLOOD QUANT: CPT

## 2024-03-21 PROCEDURE — 85027 COMPLETE CBC AUTOMATED: CPT

## 2024-03-21 PROCEDURE — 99291 CRITICAL CARE FIRST HOUR: CPT

## 2024-03-21 PROCEDURE — 82550 ASSAY OF CK (CPK): CPT

## 2024-03-21 PROCEDURE — 2020000001 HC ICU ROOM DAILY

## 2024-03-21 PROCEDURE — 84478 ASSAY OF TRIGLYCERIDES: CPT

## 2024-03-21 RX ADMIN — DEXMEDETOMIDINE HYDROCHLORIDE 0.6 MCG/KG/HR: 400 INJECTION INTRAVENOUS at 06:25

## 2024-03-21 RX ADMIN — THIAMINE HCL TAB 100 MG 100 MG: 100 TAB at 08:59

## 2024-03-21 RX ADMIN — PANTOPRAZOLE SODIUM 40 MG: 40 INJECTION, POWDER, FOR SOLUTION INTRAVENOUS at 08:59

## 2024-03-21 RX ADMIN — SENNOSIDES 5 ML: 8.8 LIQUID ORAL at 21:31

## 2024-03-21 RX ADMIN — Medication 100 MCG/HR: at 17:29

## 2024-03-21 RX ADMIN — PHENOBARBITAL SODIUM 65 MG: 65 INJECTION INTRAMUSCULAR at 08:59

## 2024-03-21 RX ADMIN — ALLOPURINOL 300 MG: 100 TABLET ORAL at 09:45

## 2024-03-21 RX ADMIN — Medication 1 TABLET: at 08:59

## 2024-03-21 RX ADMIN — PROPOFOL 25 MCG/KG/MIN: 10 INJECTION, EMULSION INTRAVENOUS at 10:36

## 2024-03-21 RX ADMIN — DEXMEDETOMIDINE HYDROCHLORIDE 0.6 MCG/KG/HR: 400 INJECTION INTRAVENOUS at 12:49

## 2024-03-21 RX ADMIN — PHENOBARBITAL SODIUM 32.5 MG: 65 INJECTION INTRAMUSCULAR at 16:45

## 2024-03-21 RX ADMIN — PROPOFOL 35 MCG/KG/MIN: 10 INJECTION, EMULSION INTRAVENOUS at 01:04

## 2024-03-21 RX ADMIN — PROPOFOL 30 MCG/KG/MIN: 10 INJECTION, EMULSION INTRAVENOUS at 21:22

## 2024-03-21 RX ADMIN — POLYETHYLENE GLYCOL 3350 17 G: 17 POWDER, FOR SOLUTION ORAL at 21:31

## 2024-03-21 RX ADMIN — PROPOFOL 25 MCG/KG/MIN: 10 INJECTION, EMULSION INTRAVENOUS at 16:36

## 2024-03-21 RX ADMIN — SENNOSIDES 5 ML: 8.8 LIQUID ORAL at 08:59

## 2024-03-21 RX ADMIN — Medication 75 MCG/HR: at 05:12

## 2024-03-21 RX ADMIN — PHENOBARBITAL SODIUM 65 MG: 65 INJECTION INTRAMUSCULAR at 00:07

## 2024-03-21 RX ADMIN — POLYETHYLENE GLYCOL 3350 17 G: 17 POWDER, FOR SOLUTION ORAL at 08:59

## 2024-03-21 RX ADMIN — ENOXAPARIN SODIUM 40 MG: 40 INJECTION SUBCUTANEOUS at 16:45

## 2024-03-21 RX ADMIN — DEXMEDETOMIDINE HYDROCHLORIDE 0.6 MCG/KG/HR: 400 INJECTION INTRAVENOUS at 00:06

## 2024-03-21 RX ADMIN — FOLIC ACID 1 MG: 1 TABLET ORAL at 09:45

## 2024-03-21 RX ADMIN — SODIUM CHLORIDE, POTASSIUM CHLORIDE, SODIUM LACTATE AND CALCIUM CHLORIDE 500 ML: 600; 310; 30; 20 INJECTION, SOLUTION INTRAVENOUS at 13:38

## 2024-03-21 RX ADMIN — PROPOFOL 30 MCG/KG/MIN: 10 INJECTION, EMULSION INTRAVENOUS at 05:12

## 2024-03-21 RX ADMIN — DEXMEDETOMIDINE HYDROCHLORIDE 0.6 MCG/KG/HR: 400 INJECTION INTRAVENOUS at 18:43

## 2024-03-21 ASSESSMENT — COGNITIVE AND FUNCTIONAL STATUS - GENERAL
STANDING UP FROM CHAIR USING ARMS: TOTAL
DAILY ACTIVITIY SCORE: 6
EATING MEALS: TOTAL
DRESSING REGULAR UPPER BODY CLOTHING: TOTAL
CLIMB 3 TO 5 STEPS WITH RAILING: TOTAL
HELP NEEDED FOR BATHING: TOTAL
TOILETING: TOTAL
MOVING FROM LYING ON BACK TO SITTING ON SIDE OF FLAT BED WITH BEDRAILS: A LOT
MOBILITY SCORE: 8
DRESSING REGULAR LOWER BODY CLOTHING: TOTAL
WALKING IN HOSPITAL ROOM: TOTAL
PERSONAL GROOMING: TOTAL
TURNING FROM BACK TO SIDE WHILE IN FLAT BAD: A LOT
MOVING TO AND FROM BED TO CHAIR: TOTAL

## 2024-03-21 ASSESSMENT — PAIN - FUNCTIONAL ASSESSMENT
PAIN_FUNCTIONAL_ASSESSMENT: CPOT (CRITICAL CARE PAIN OBSERVATION TOOL)

## 2024-03-21 ASSESSMENT — PAIN SCALES - GENERAL: PAINLEVEL_OUTOF10: 0 - NO PAIN

## 2024-03-21 NOTE — PROGRESS NOTES
Subjective:  Patient seen at bedside today, less agitation overnight.  Wean sedation as able.  Continue to monitor.    Admission history:  Neo Villalobos is a 44 y.o. male with a past medical history of alcohol use disorder with a history of DTs without seizure, Margoth Deluca tear, gout, paroxysmal A-fib, COPD, hypertension presents to [place] ED with concerns for alcohol withdrawal and abdominal pain.  In the ED, patient's EtOH was 260, CIWA 51, , , bicarb 15.  He received 20 mg of Valium, 10 mg Ativan and started on Precedex GGT. patient was agitated and refused phenobarbital although based on chart review, he has received phenobarbital multiple times in the past.  CT A/P showed enterocolitis, hepatic steatosis and colonic diverticulosis.  Due to extreme agitation and danger to self with pulling lines patient was placed on soft restraints.    Past medical history: As above  Past surgical history: Hip replacement 2021  Past social history: Alcohol abuse disorder (per chart review, a fifth vodka per day with multiple relapses after quitting), former smoker per chart review  Past family history: Mother has hypertension, diabetes and heart disease    Review of systems:  10 point review of systems performed and negative except as stated above.    Physical Exam:  General: Intubated, sedated   HEENT:  Normocephalic, atraumatic, mucus membranes moist.   Neck:  Trachea midline.  No JVD.    Chest:  Clear to auscultation bilaterally. No wheezes, rales, or rhonchi.  CV:  Regular rate and rhythm.  Positive S1/S2. No murmur, no gallops, no rubs  Abdomen: Bowel sounds present in all four quadrants, abdomen is soft, non-tender, non-distended.  Extremities:  No lower extremity edema or cyanosis.   Neurological: Intubated, sedated  Skin:  Warm and dry.    Daily progress:   3/18: Phenobarb 260 mg ordered since load was not given in the ED.  Acute urinary retention, Emanuel catheter inserted.  Increase D5 normal saline  rate to 125 cc/h.  Patient continues to be agitated and yelling intermittently, danger to self with pulling lines, soft restraints renewed.  RFP and magnesium levels twice daily.  3/19: Pt became more agitated this morning and received an extra dose of valium and precedex was up titrated. He became apneic and was obtunded and was not protecting his airway. He was intubated for airway protection. Valium held. Continue phenobarb. Sedation with propofol.  3/20: Patient intermittently agitated and poses a danger to self with pulling lines, soft restraints ordered.  Ordered 260 mg phenobarbital x3.  Versed 4 mg once.  Plan to extubate in the next 24 to 48 hours.  Electrolytes replenished this morning.  Patient was given 1 L LR overnight due to soft pressures, continue to monitor.  Patient not having much urine output today we will continue to monitor this afternoon.  Transition to oral thiamine tomorrow if able.  3/21: Patient less agitated overnight.  Wean sedation as tolerated.  Transition to oral thiamine today.  500 cc bolus ordered, will watch for urine output as patient has not been making a lot of urine.  Will attempt awakening trial tomorrow.    Assessment + Plan:     Neuro:  #Acute alcohol intoxication and DTs  #Alcohol withdrawal  #Agitation  #Altered mental status  -Precedex GGT initiated in the ED, wean as tolerated  -Continue phenobarb taper   -Valium as needed  -500 mg thiamine IV 3 times daily for 3 days then transition to p.o. 100 mg daily  -Folic acid IV  -NPO due to altered mental status  -Due to extreme agitation and danger to self with pulling lines patient was placed on soft restraints.    CV:  #History of A-fib  -Holding home p.o. metoprolol while NPO, IV Lopressor as needed    Respiratory:  #Acute respiratory insufficiency  Patient was intubated 3/19 for airway protection. Will attempt awakening trial tomorrow    GI:  #Transaminitis, downtrending  #Acute enterocolitis  -Secondary to alcoholic  hepatitis/steatosis.  Continue to monitor  -PPI as tolerated    Endo:  No acute abnormalities    Renal:  #Alcoholic ketoacidosis, resolved  #High anion gap metabolic acidosis likely secondary to above  -Likely secondary to alcoholic ketosis or starvation ketosis.    -Thiamine 500 mg IV 3 times daily for 3 days then transition to oral    Hematological:  No acute abnormalities    ID:  No acute abnormalities    Vent/O2: Intubated 3/19  Lines/Devices: PIV, dawson  Drips: Precedex, propofol, fentanyl   ATBx: None  Fluids: none  Diet: N.p.o.  PPX: Protonix, Lovenox  Code status: full code  Dispo: ICU

## 2024-03-21 NOTE — CARE PLAN
Problem: Safety - Medical Restraint  Goal: Remains free of injury from restraints (Restraint for Interference with Medical Device)  Outcome: Progressing  Goal: Free from restraint(s) (Restraint for Interference with Medical Device)  Outcome: Progressing

## 2024-03-21 NOTE — CARE PLAN
The patient's goals for the shift include      The clinical goals for the shift include Pt. will maintain vital lines and tubes throughout the shift.      Problem: Skin  Goal: Decreased wound size/increased tissue granulation at next dressing change  Outcome: Progressing  Goal: Participates in plan/prevention/treatment measures  Outcome: Progressing  Goal: Prevent/manage excess moisture  Outcome: Progressing  Goal: Prevent/minimize sheer/friction injuries  Outcome: Progressing  Goal: Promote/optimize nutrition  Outcome: Progressing     Problem: Fall/Injury  Goal: Not fall by end of shift  Outcome: Progressing  Goal: Be free from injury by end of the shift  Outcome: Progressing  Goal: Verbalize understanding of personal risk factors for fall in the hospital  Outcome: Progressing  Goal: Verbalize understanding of risk factor reduction measures to prevent injury from fall in the home  Outcome: Progressing     Problem: Pain  Goal: Takes deep breaths with improved pain control throughout the shift  Outcome: Progressing  Goal: Turns in bed with improved pain control throughout the shift  Outcome: Progressing  Goal: Walks with improved pain control throughout the shift  Outcome: Progressing  Goal: Performs ADL's with improved pain control throughout shift  Outcome: Progressing  Goal: Participates in PT with improved pain control throughout the shift  Outcome: Progressing     Problem: Pain  Goal: My pain/discomfort is manageable  Outcome: Progressing     Problem: Safety  Goal: Patient will be injury free during hospitalization  Outcome: Progressing  Goal: I will remain free of falls  Outcome: Progressing     Problem: Daily Care  Goal: Daily care needs are met  Outcome: Progressing     Problem: Psychosocial Needs  Goal: Demonstrates ability to cope with hospitalization/illness  Outcome: Progressing  Goal: Collaborate with me, my family, and caregiver to identify my specific goals  Outcome: Progressing     Problem: Discharge  Barriers  Goal: My discharge needs are met  Outcome: Progressing     Problem: Resident has compromised skin integrity  Goal: I will maintain or improve skin integrity  Outcome: Progressing     Problem: Safety - Medical Restraint  Goal: Remains free of injury from restraints (Restraint for Interference with Medical Device)  Outcome: Progressing  Goal: Free from restraint(s) (Restraint for Interference with Medical Device)  Outcome: Progressing

## 2024-03-21 NOTE — PROGRESS NOTES
Rounded with ICU team.  Patient remains intubated and sedated.  Weaning sedation as able.  Plan for SBT tomorrow.  SW following for ETOH resources once extubated.  Care Transitions will continue to follow.  Sofi Gómez RN BSN, [facility]

## 2024-03-22 LAB
ALBUMIN SERPL BCP-MCNC: 2.8 G/DL (ref 3.4–5)
ALP SERPL-CCNC: 89 U/L (ref 33–120)
ALT SERPL W P-5'-P-CCNC: 92 U/L (ref 10–52)
ANION GAP SERPL CALC-SCNC: 13 MMOL/L (ref 10–20)
AST SERPL W P-5'-P-CCNC: 68 U/L (ref 9–39)
BILIRUB SERPL-MCNC: 0.9 MG/DL (ref 0–1.2)
BUN SERPL-MCNC: 10 MG/DL (ref 6–23)
CALCIUM SERPL-MCNC: 7.2 MG/DL (ref 8.6–10.3)
CHLORIDE SERPL-SCNC: 105 MMOL/L (ref 98–107)
CO2 SERPL-SCNC: 24 MMOL/L (ref 21–32)
CREAT SERPL-MCNC: 0.67 MG/DL (ref 0.5–1.3)
EGFRCR SERPLBLD CKD-EPI 2021: >90 ML/MIN/1.73M*2
ERYTHROCYTE [DISTWIDTH] IN BLOOD BY AUTOMATED COUNT: 12.9 % (ref 11.5–14.5)
GLUCOSE BLD MANUAL STRIP-MCNC: 90 MG/DL (ref 74–99)
GLUCOSE BLD MANUAL STRIP-MCNC: 97 MG/DL (ref 74–99)
GLUCOSE SERPL-MCNC: 96 MG/DL (ref 74–99)
HCT VFR BLD AUTO: 37.3 % (ref 41–52)
HGB BLD-MCNC: 12.7 G/DL (ref 13.5–17.5)
MAGNESIUM SERPL-MCNC: 1.57 MG/DL (ref 1.6–2.4)
MCH RBC QN AUTO: 35.7 PG (ref 26–34)
MCHC RBC AUTO-ENTMCNC: 34 G/DL (ref 32–36)
MCV RBC AUTO: 105 FL (ref 80–100)
NRBC BLD-RTO: 0 /100 WBCS (ref 0–0)
PLATELET # BLD AUTO: 78 X10*3/UL (ref 150–450)
POTASSIUM SERPL-SCNC: 3.6 MMOL/L (ref 3.5–5.3)
PROT SERPL-MCNC: 4.8 G/DL (ref 6.4–8.2)
RBC # BLD AUTO: 3.56 X10*6/UL (ref 4.5–5.9)
SODIUM SERPL-SCNC: 138 MMOL/L (ref 136–145)
TRIGL SERPL-MCNC: 112 MG/DL (ref 0–149)
WBC # BLD AUTO: 3.9 X10*3/UL (ref 4.4–11.3)

## 2024-03-22 PROCEDURE — 84478 ASSAY OF TRIGLYCERIDES: CPT

## 2024-03-22 PROCEDURE — 80053 COMPREHEN METABOLIC PANEL: CPT

## 2024-03-22 PROCEDURE — 2500000001 HC RX 250 WO HCPCS SELF ADMINISTERED DRUGS (ALT 637 FOR MEDICARE OP)

## 2024-03-22 PROCEDURE — 94003 VENT MGMT INPAT SUBQ DAY: CPT

## 2024-03-22 PROCEDURE — 99291 CRITICAL CARE FIRST HOUR: CPT

## 2024-03-22 PROCEDURE — 2500000004 HC RX 250 GENERAL PHARMACY W/ HCPCS (ALT 636 FOR OP/ED)

## 2024-03-22 PROCEDURE — 2020000001 HC ICU ROOM DAILY

## 2024-03-22 PROCEDURE — 2500000005 HC RX 250 GENERAL PHARMACY W/O HCPCS: Performed by: STUDENT IN AN ORGANIZED HEALTH CARE EDUCATION/TRAINING PROGRAM

## 2024-03-22 PROCEDURE — 36415 COLL VENOUS BLD VENIPUNCTURE: CPT

## 2024-03-22 PROCEDURE — C9113 INJ PANTOPRAZOLE SODIUM, VIA: HCPCS

## 2024-03-22 PROCEDURE — 85027 COMPLETE CBC AUTOMATED: CPT

## 2024-03-22 PROCEDURE — 82947 ASSAY GLUCOSE BLOOD QUANT: CPT

## 2024-03-22 PROCEDURE — 83735 ASSAY OF MAGNESIUM: CPT

## 2024-03-22 PROCEDURE — 2500000004 HC RX 250 GENERAL PHARMACY W/ HCPCS (ALT 636 FOR OP/ED): Performed by: INTERNAL MEDICINE

## 2024-03-22 RX ORDER — DIAZEPAM 5 MG/ML
5 INJECTION, SOLUTION INTRAMUSCULAR; INTRAVENOUS EVERY 4 HOURS PRN
Status: DISCONTINUED | OUTPATIENT
Start: 2024-03-22 | End: 2024-03-23

## 2024-03-22 RX ORDER — MAGNESIUM SULFATE HEPTAHYDRATE 40 MG/ML
2 INJECTION, SOLUTION INTRAVENOUS ONCE
Status: COMPLETED | OUTPATIENT
Start: 2024-03-22 | End: 2024-03-22

## 2024-03-22 RX ORDER — DIAZEPAM 5 MG/ML
2 INJECTION, SOLUTION INTRAMUSCULAR; INTRAVENOUS EVERY 4 HOURS PRN
Status: DISCONTINUED | OUTPATIENT
Start: 2024-03-22 | End: 2024-03-23

## 2024-03-22 RX ADMIN — DIAZEPAM 2 MG: 5 INJECTION, SOLUTION INTRAMUSCULAR; INTRAVENOUS at 22:52

## 2024-03-22 RX ADMIN — DEXMEDETOMIDINE HYDROCHLORIDE 1.2 MCG/KG/HR: 400 INJECTION INTRAVENOUS at 14:58

## 2024-03-22 RX ADMIN — PHENOL 1 SPRAY: 1.4 SPRAY ORAL at 17:34

## 2024-03-22 RX ADMIN — PHENOBARBITAL SODIUM 32.5 MG: 65 INJECTION INTRAMUSCULAR at 00:55

## 2024-03-22 RX ADMIN — PHENOL 1 SPRAY: 1.4 SPRAY ORAL at 14:15

## 2024-03-22 RX ADMIN — PHENOBARBITAL SODIUM 32.5 MG: 65 INJECTION INTRAMUSCULAR at 07:35

## 2024-03-22 RX ADMIN — PROPOFOL 30 MCG/KG/MIN: 10 INJECTION, EMULSION INTRAVENOUS at 01:03

## 2024-03-22 RX ADMIN — PANTOPRAZOLE SODIUM 40 MG: 40 INJECTION, POWDER, FOR SOLUTION INTRAVENOUS at 10:00

## 2024-03-22 RX ADMIN — PHENOBARBITAL SODIUM 32.5 MG: 65 INJECTION INTRAMUSCULAR at 17:00

## 2024-03-22 RX ADMIN — ENOXAPARIN SODIUM 40 MG: 40 INJECTION SUBCUTANEOUS at 17:00

## 2024-03-22 RX ADMIN — PHENOL 1 SPRAY: 1.4 SPRAY ORAL at 20:32

## 2024-03-22 RX ADMIN — DEXMEDETOMIDINE HYDROCHLORIDE 1 MCG/KG/HR: 400 INJECTION INTRAVENOUS at 22:23

## 2024-03-22 RX ADMIN — DEXMEDETOMIDINE HYDROCHLORIDE 1.2 MCG/KG/HR: 400 INJECTION INTRAVENOUS at 11:35

## 2024-03-22 RX ADMIN — Medication 2 L/MIN: at 08:00

## 2024-03-22 RX ADMIN — Medication 75 MCG/HR: at 07:15

## 2024-03-22 RX ADMIN — MAGNESIUM SULFATE HEPTAHYDRATE 2 G: 40 INJECTION, SOLUTION INTRAVENOUS at 07:34

## 2024-03-22 RX ADMIN — PROPOFOL 30 MCG/KG/MIN: 10 INJECTION, EMULSION INTRAVENOUS at 07:03

## 2024-03-22 RX ADMIN — DEXMEDETOMIDINE HYDROCHLORIDE 0.6 MCG/KG/HR: 400 INJECTION INTRAVENOUS at 07:03

## 2024-03-22 RX ADMIN — DIAZEPAM 5 MG: 5 INJECTION, SOLUTION INTRAMUSCULAR; INTRAVENOUS at 23:55

## 2024-03-22 RX ADMIN — Medication: at 20:00

## 2024-03-22 RX ADMIN — DEXMEDETOMIDINE HYDROCHLORIDE 0.9 MCG/KG/HR: 400 INJECTION INTRAVENOUS at 18:36

## 2024-03-22 RX ADMIN — DEXMEDETOMIDINE HYDROCHLORIDE 0.6 MCG/KG/HR: 400 INJECTION INTRAVENOUS at 01:03

## 2024-03-22 ASSESSMENT — LIFESTYLE VARIABLES
VISUAL DISTURBANCES: MILD SENSITIVITY
TOTAL SCORE: 12
ORIENTATION AND CLOUDING OF SENSORIUM: DISORIENTED FOR DATE BY MORE THAN 2 CALENDAR DAYS
TOTAL SCORE: 5
PAROXYSMAL SWEATS: 2
ORIENTATION AND CLOUDING OF SENSORIUM: CANNOT DO SERIAL ADDITIONS OR IS UNCERTAIN ABOUT DATE
ANXIETY: 2
ORIENTATION AND CLOUDING OF SENSORIUM: DISORIENTED FOR DATE BY MORE THAN 2 CALENDAR DAYS
ORIENTATION AND CLOUDING OF SENSORIUM: DISORIENTED FOR DATA BY NO MORE THAN 2 CALENDAR DAYS
ANXIETY: MILDLY ANXIOUS
AGITATION: NORMAL ACTIVITY
ORIENTATION AND CLOUDING OF SENSORIUM: CANNOT DO SERIAL ADDITIONS OR IS UNCERTAIN ABOUT DATE
PAROXYSMAL SWEATS: 2
VISUAL DISTURBANCES: MODERATELY SEVERE HALLUCINATIONS
HEADACHE, FULLNESS IN HEAD: MODERATE
HEADACHE, FULLNESS IN HEAD: MILD
AUDITORY DISTURBANCES: NOT PRESENT
NAUSEA AND VOMITING: NO NAUSEA AND NO VOMITING
TREMOR: NO TREMOR
HEADACHE, FULLNESS IN HEAD: NOT PRESENT
AUDITORY DISTURBANCES: NOT PRESENT
AGITATION: 3
TREMOR: MODERATE, WITH PATIENT'S ARMS EXTENDED
AGITATION: SOMEWHAT MORE THAN NORMAL ACTIVITY
TOTAL SCORE: 6
PAROXYSMAL SWEATS: 2
TREMOR: NO TREMOR
PAROXYSMAL SWEATS: 2
AUDITORY DISTURBANCES: NOT PRESENT
HEADACHE, FULLNESS IN HEAD: NOT PRESENT
AUDITORY DISTURBANCES: NOT PRESENT
TREMOR: NO TREMOR
HEADACHE, FULLNESS IN HEAD: NOT PRESENT
VISUAL DISTURBANCES: VERY MILD SENSITIVITY
AGITATION: NORMAL ACTIVITY
PAROXYSMAL SWEATS: 2
ANXIETY: 2
NAUSEA AND VOMITING: 2
TREMOR: NO TREMOR
NAUSEA AND VOMITING: MILD NAUSEA WITH NO VOMITING
NAUSEA AND VOMITING: NO NAUSEA AND NO VOMITING
AUDITORY DISTURBANCES: NOT PRESENT
ORIENTATION AND CLOUDING OF SENSORIUM: CANNOT DO SERIAL ADDITIONS OR IS UNCERTAIN ABOUT DATE
PULSE: 78
HEADACHE, FULLNESS IN HEAD: NOT PRESENT
PAROXYSMAL SWEATS: BEADS OF SWEAT OBVIOUS ON FOREHEAD
VISUAL DISTURBANCES: MILD SENSITIVITY
AGITATION: SOMEWHAT MORE THAN NORMAL ACTIVITY
TREMOR: NO TREMOR
PAROXYSMAL SWEATS: 2
TOTAL SCORE: 13
VISUAL DISTURBANCES: MODERATE SENSITIVITY
TOTAL SCORE: 8
TOTAL SCORE: 23
NAUSEA AND VOMITING: NO NAUSEA AND NO VOMITING
VISUAL DISTURBANCES: VERY MILD SENSITIVITY
HEADACHE, FULLNESS IN HEAD: NOT PRESENT
HEADACHE, FULLNESS IN HEAD: NOT PRESENT
AUDITORY DISTURBANCES: NOT PRESENT
AUDITORY DISTURBANCES: NOT PRESENT
NAUSEA AND VOMITING: NO NAUSEA AND NO VOMITING
AGITATION: NORMAL ACTIVITY
ANXIETY: MODERATELY ANXIOUS, OR GUARDED, SO ANXIETY IS INFERRED
ORIENTATION AND CLOUDING OF SENSORIUM: CANNOT DO SERIAL ADDITIONS OR IS UNCERTAIN ABOUT DATE
TREMOR: NO TREMOR
ANXIETY: MILDLY ANXIOUS
VISUAL DISTURBANCES: NOT PRESENT
AUDITORY DISTURBANCES: NOT PRESENT
NAUSEA AND VOMITING: NO NAUSEA AND NO VOMITING
TOTAL SCORE: 4
AGITATION: NORMAL ACTIVITY
TOTAL SCORE: 7
ANXIETY: 3
ANXIETY: 3
VISUAL DISTURBANCES: NOT PRESENT
PAROXYSMAL SWEATS: 2
NAUSEA AND VOMITING: NO NAUSEA AND NO VOMITING
ORIENTATION AND CLOUDING OF SENSORIUM: CANNOT DO SERIAL ADDITIONS OR IS UNCERTAIN ABOUT DATE
ANXIETY: MILDLY ANXIOUS
AGITATION: NORMAL ACTIVITY
TREMOR: NO TREMOR

## 2024-03-22 ASSESSMENT — PAIN SCALES - GENERAL
PAINLEVEL_OUTOF10: 0 - NO PAIN
PAINLEVEL_OUTOF10: 5 - MODERATE PAIN
PAINLEVEL_OUTOF10: 0 - NO PAIN
PAINLEVEL_OUTOF10: 5 - MODERATE PAIN
PAINLEVEL_OUTOF10: 2

## 2024-03-22 ASSESSMENT — PAIN - FUNCTIONAL ASSESSMENT
PAIN_FUNCTIONAL_ASSESSMENT: 0-10
PAIN_FUNCTIONAL_ASSESSMENT: CPOT (CRITICAL CARE PAIN OBSERVATION TOOL)
PAIN_FUNCTIONAL_ASSESSMENT: 0-10

## 2024-03-22 NOTE — CARE PLAN
Problem: Skin  Goal: Prevent/manage excess moisture  Outcome: Progressing  Goal: Prevent/minimize sheer/friction injuries  Outcome: Progressing  Goal: Promote/optimize nutrition  Outcome: Progressing  Problem: Fall/Injury  Goal: Not fall by end of shift  Outcome: Progressing  Goal: Be free from injury by end of the shift  Outcome: Progressing  Goal: Verbalize understanding of personal risk factors for fall in the hospital  Outcome: Progressing  Goal: Verbalize understanding of risk factor reduction measures to prevent injury from fall in the home  Outcome: Progressing  Problem: Pain  Goal: Takes deep breaths with improved pain control throughout the shift  Outcome: Progressing  Goal: Turns in bed with improved pain control throughout the shift  Outcome: Progressing  Goal: Walks with improved pain control throughout the shift  Outcome: Progressing  Goal: Performs ADL's with improved pain control throughout shift  Outcome: Progressing  Goal: Participates in PT with improved pain control throughout the shift  Outcome: Progressing  Problem: Pain  Goal: My pain/discomfort is manageable  Outcome: Progressing  Problem: Safety  Goal: Patient will be injury free during hospitalization  Outcome: Progressing  Goal: I will remain free of falls  Outcome: Progressing  Problem: Daily Care  Goal: Daily care needs are met  Outcome: Progressing  Problem: Psychosocial Needs  Goal: Demonstrates ability to cope with hospitalization/illness  Outcome: Progressing  Goal: Collaborate with me, my family, and caregiver to identify my specific goals  Outcome: Progressing  Problem: Discharge Barriers  Goal: My discharge needs are met  Outcome: Progressing

## 2024-03-22 NOTE — PROGRESS NOTES
Attempted to meet with patient today to discuss ETOH use but patient is still heavily sedated. Will follow up as appropriate.  will continue to follow. Message with questions.  HANNAH Arce

## 2024-03-22 NOTE — CARE PLAN
Problem: Skin  Goal: Decreased wound size/increased tissue granulation at next dressing change  3/22/2024 0606 by Maynor Song RN  Outcome: Progressing  Flowsheets (Taken 3/22/2024 0606)  Decreased wound size/increased tissue granulation at next dressing change: Promote sleep for wound healing  3/21/2024 1952 by Maynor Song RN  Outcome: Progressing  Goal: Participates in plan/prevention/treatment measures  3/22/2024 0606 by Maynor Song RN  Outcome: Progressing  Flowsheets (Taken 3/22/2024 0606)  Participates in plan/prevention/treatment measures: Elevate heels  3/21/2024 1952 by Maynor Song RN  Outcome: Progressing  Goal: Prevent/manage excess moisture  3/22/2024 0606 by Maynor Song RN  Outcome: Progressing  Flowsheets (Taken 3/22/2024 0606)  Prevent/manage excess moisture:   Monitor for/manage infection if present   Cleanse incontinence/protect with barrier cream  3/21/2024 1952 by Maynor Song RN  Outcome: Progressing  Goal: Prevent/minimize sheer/friction injuries  3/22/2024 0606 by Maynor Song RN  Outcome: Progressing  Flowsheets (Taken 3/22/2024 0606)  Prevent/minimize sheer/friction injuries:   Use pull sheet   Turn/reposition every 2 hours/use positioning/transfer devices  3/21/2024 1952 by Maynor Song RN  Outcome: Progressing  Goal: Promote/optimize nutrition  3/22/2024 0606 by Maynor Song RN  Outcome: Progressing  Flowsheets (Taken 3/22/2024 0606)  Promote/optimize nutrition:   Consume > 50% meals/supplements   Assist with feeding   Monitor/record intake including meals  3/21/2024 1952 by Maynor Song RN  Outcome: Progressing   The patient's goals for the shift include      The clinical goals for the shift include Pt. will maintain vital tubes and lines throughout the shift.    Problem: Fall/Injury  Goal: Not fall by end of shift  3/22/2024 0606 by Maynor Song RN  Outcome: Progressing  3/21/2024 1952 by Maynor  SERVANDO Song  Outcome: Progressing  Goal: Be free from injury by end of the shift  3/22/2024 0606 by Maynor Song RN  Outcome: Progressing  3/21/2024 1952 by Maynor Song RN  Outcome: Progressing  Goal: Verbalize understanding of personal risk factors for fall in the hospital  3/22/2024 0606 by Maynor Song RN  Outcome: Progressing  3/21/2024 1952 by Maynor Song RN  Outcome: Progressing  Goal: Verbalize understanding of risk factor reduction measures to prevent injury from fall in the home  3/22/2024 0606 by Maynor Song RN  Outcome: Progressing  3/21/2024 1952 by Maynor Song RN  Outcome: Progressing     Problem: Pain  Goal: Takes deep breaths with improved pain control throughout the shift  3/22/2024 0606 by Maynor Song RN  Outcome: Progressing  3/21/2024 1952 by Maynor Song RN  Outcome: Progressing  Goal: Turns in bed with improved pain control throughout the shift  3/22/2024 0606 by Maynor Song RN  Outcome: Progressing  3/21/2024 1952 by Maynor Song RN  Outcome: Progressing  Goal: Walks with improved pain control throughout the shift  3/22/2024 0606 by Maynor Song RN  Outcome: Progressing  3/21/2024 1952 by Maynor Song RN  Outcome: Progressing  Goal: Performs ADL's with improved pain control throughout shift  3/22/2024 0606 by Maynor Song RN  Outcome: Progressing  3/21/2024 1952 by Maynor Song RN  Outcome: Progressing  Goal: Participates in PT with improved pain control throughout the shift  3/22/2024 0606 by Maynor Song RN  Outcome: Progressing  3/21/2024 1952 by Maynor Song RN  Outcome: Progressing     Problem: Pain  Goal: My pain/discomfort is manageable  3/22/2024 0606 by Maynor Song RN  Outcome: Progressing  3/21/2024 1952 by Maynor Song RN  Outcome: Progressing     Problem: Safety  Goal: Patient will be injury free during hospitalization  3/22/2024 0606 by Maynor  SERVANDO Song  Outcome: Progressing  3/21/2024 1952 by Maynor Song RN  Outcome: Progressing  Goal: I will remain free of falls  3/22/2024 0606 by Maynor Song RN  Outcome: Progressing  3/21/2024 1952 by Maynor Song RN  Outcome: Progressing     Problem: Daily Care  Goal: Daily care needs are met  3/22/2024 0606 by Maynor Song RN  Outcome: Progressing  3/21/2024 1952 by Maynor Song RN  Outcome: Progressing     Problem: Psychosocial Needs  Goal: Demonstrates ability to cope with hospitalization/illness  3/22/2024 0606 by Maynor Song RN  Outcome: Progressing  3/21/2024 1952 by Maynor Song RN  Outcome: Progressing  Goal: Collaborate with me, my family, and caregiver to identify my specific goals  3/22/2024 0606 by Maynor Song RN  Outcome: Progressing  3/21/2024 1952 by Maynor Song RN  Outcome: Progressing     Problem: Discharge Barriers  Goal: My discharge needs are met  3/22/2024 0606 by Maynor Song RN  Outcome: Progressing  3/21/2024 1952 by Maynor Song RN  Outcome: Progressing     Problem: Resident has compromised skin integrity  Goal: I will maintain or improve skin integrity  3/22/2024 0606 by Maynor Song RN  Outcome: Progressing  3/21/2024 1952 by Maynor Song RN  Outcome: Progressing     Problem: Safety - Medical Restraint  Goal: Remains free of injury from restraints (Restraint for Interference with Medical Device)  3/22/2024 0606 by Maynor Song RN  Outcome: Progressing  3/21/2024 1952 by Maynor Song RN  Outcome: Progressing  Goal: Free from restraint(s) (Restraint for Interference with Medical Device)  3/22/2024 0606 by Maynor Song RN  Outcome: Progressing  3/21/2024 1952 by Maynor Song RN  Outcome: Progressing

## 2024-03-22 NOTE — PROGRESS NOTES
Subjective:  Patient seen at bedside today, extubated.  Continue to monitor.    Admission history:  Neo Villalobos is a 44 y.o. male with a past medical history of alcohol use disorder with a history of DTs without seizure, Margoth Deluca tear, gout, paroxysmal A-fib, COPD, hypertension presents to [place] ED with concerns for alcohol withdrawal and abdominal pain.  In the ED, patient's EtOH was 260, CIWA 51, , , bicarb 15.  He received 20 mg of Valium, 10 mg Ativan and started on Precedex GGT. patient was agitated and refused phenobarbital although based on chart review, he has received phenobarbital multiple times in the past.  CT A/P showed enterocolitis, hepatic steatosis and colonic diverticulosis.  Due to extreme agitation and danger to self with pulling lines patient was placed on soft restraints.    Past medical history: As above  Past surgical history: Hip replacement 2021  Past social history: Alcohol abuse disorder (per chart review, a fifth vodka per day with multiple relapses after quitting), former smoker per chart review  Past family history: Mother has hypertension, diabetes and heart disease    Review of systems:  10 point review of systems performed and negative except as stated above.    Physical Exam:  General: Intubated, sedated   HEENT:  Normocephalic, atraumatic, mucus membranes moist.   Neck:  Trachea midline.  No JVD.    Chest:  Clear to auscultation bilaterally. No wheezes, rales, or rhonchi.  CV:  Regular rate and rhythm.  Positive S1/S2. No murmur, no gallops, no rubs  Abdomen: Bowel sounds present in all four quadrants, abdomen is soft, non-tender, non-distended.  Extremities:  No lower extremity edema or cyanosis.   Neurological: Intubated, sedated  Skin:  Warm and dry.    Daily progress:   3/18: Phenobarb 260 mg ordered since load was not given in the ED.  Acute urinary retention, Emanuel catheter inserted.  Increase D5 normal saline rate to 125 cc/h.  Patient continues to  be agitated and yelling intermittently, danger to self with pulling lines, soft restraints renewed.  RFP and magnesium levels twice daily.  3/19: Pt became more agitated this morning and received an extra dose of valium and precedex was up titrated. He became apneic and was obtunded and was not protecting his airway. He was intubated for airway protection. Valium held. Continue phenobarb. Sedation with propofol.  3/20: Patient intermittently agitated and poses a danger to self with pulling lines, soft restraints ordered.  Ordered 260 mg phenobarbital x3.  Versed 4 mg once.  Plan to extubate in the next 24 to 48 hours.  Electrolytes replenished this morning.  Patient was given 1 L LR overnight due to soft pressures, continue to monitor.  Patient not having much urine output today we will continue to monitor this afternoon.  Transition to oral thiamine tomorrow if able.  3/21: Patient less agitated overnight.  Wean sedation as tolerated.  Transition to oral thiamine today.  500 cc bolus ordered, will watch for urine output as patient has not been making a lot of urine.  Will attempt awakening trial tomorrow.  3/22: Patient extubated successfully today.  Fentanyl, propofol discontinued.  Continue to wean Precedex.  Chloraseptic spray ordered for soreness.  Continue phenobarb taper, continue to monitor.    Assessment + Plan:     Neuro:  #Acute alcohol intoxication and DTs  #Alcohol withdrawal  #Agitation  #Altered mental status  -Precedex GGT initiated in the ED, wean as tolerated  -Continue phenobarb taper   -Valium as needed  -500 mg thiamine IV 3 times daily for 3 days then transition to p.o. 100 mg daily  -Folic acid IV  -NPO due to altered mental status  -Due to extreme agitation and danger to self with pulling lines patient was placed on soft restraints.    CV:  #History of A-fib  -Holding home p.o. metoprolol while NPO, IV Lopressor as needed    Respiratory:  #Acute respiratory insufficiency  Patient was  intubated 3/19 for airway protection. Will attempt awakening trial tomorrow    GI:  #Transaminitis, downtrending  #Acute enterocolitis  -Secondary to alcoholic hepatitis/steatosis.  Continue to monitor  -PPI as tolerated    Endo:  No acute abnormalities    Renal:  No acute abnormalities    Hematological:  No acute abnormalities    ID:  No acute abnormalities    Vent/O2: Intubated 3/19  Lines/Devices: PIV, dawson  Drips: Precedex, propofol, fentanyl   ATBx: None  Fluids: none  Diet: Regular  PPX: Protonix, Lovenox  Code status: full code  Dispo: ICU

## 2024-03-22 NOTE — NURSING NOTE
Block Charting Note    Medication: dexmedetomidine  Time block charting initiated: 0703  Starting dose: 0.6  Ending dose: 1.2  Maximum dose: 1.5  Time block charting completed: 1135  Physiologic parameters: RASS 0 to -1

## 2024-03-23 VITALS
HEIGHT: 69 IN | DIASTOLIC BLOOD PRESSURE: 95 MMHG | BODY MASS INDEX: 35.2 KG/M2 | SYSTOLIC BLOOD PRESSURE: 135 MMHG | TEMPERATURE: 98.1 F | HEART RATE: 92 BPM | WEIGHT: 237.66 LBS | OXYGEN SATURATION: 98 % | RESPIRATION RATE: 13 BRPM

## 2024-03-23 LAB
ALBUMIN SERPL BCP-MCNC: 3.3 G/DL (ref 3.4–5)
ALP SERPL-CCNC: 89 U/L (ref 33–120)
ALT SERPL W P-5'-P-CCNC: 75 U/L (ref 10–52)
ANION GAP SERPL CALC-SCNC: 13 MMOL/L (ref 10–20)
AST SERPL W P-5'-P-CCNC: 51 U/L (ref 9–39)
BILIRUB SERPL-MCNC: 0.9 MG/DL (ref 0–1.2)
BUN SERPL-MCNC: 6 MG/DL (ref 6–23)
CALCIUM SERPL-MCNC: 8.2 MG/DL (ref 8.6–10.3)
CHLORIDE SERPL-SCNC: 102 MMOL/L (ref 98–107)
CO2 SERPL-SCNC: 26 MMOL/L (ref 21–32)
CREAT SERPL-MCNC: 0.63 MG/DL (ref 0.5–1.3)
EGFRCR SERPLBLD CKD-EPI 2021: >90 ML/MIN/1.73M*2
ERYTHROCYTE [DISTWIDTH] IN BLOOD BY AUTOMATED COUNT: 12.3 % (ref 11.5–14.5)
GLUCOSE SERPL-MCNC: 94 MG/DL (ref 74–99)
HCT VFR BLD AUTO: 36.2 % (ref 41–52)
HGB BLD-MCNC: 12.5 G/DL (ref 13.5–17.5)
MAGNESIUM SERPL-MCNC: 1.71 MG/DL (ref 1.6–2.4)
MCH RBC QN AUTO: 35.4 PG (ref 26–34)
MCHC RBC AUTO-ENTMCNC: 34.5 G/DL (ref 32–36)
MCV RBC AUTO: 103 FL (ref 80–100)
NRBC BLD-RTO: 0 /100 WBCS (ref 0–0)
PLATELET # BLD AUTO: 90 X10*3/UL (ref 150–450)
POTASSIUM SERPL-SCNC: 3.8 MMOL/L (ref 3.5–5.3)
PROT SERPL-MCNC: 5.3 G/DL (ref 6.4–8.2)
RBC # BLD AUTO: 3.53 X10*6/UL (ref 4.5–5.9)
SODIUM SERPL-SCNC: 137 MMOL/L (ref 136–145)
WBC # BLD AUTO: 5.2 X10*3/UL (ref 4.4–11.3)

## 2024-03-23 PROCEDURE — 99232 SBSQ HOSP IP/OBS MODERATE 35: CPT

## 2024-03-23 PROCEDURE — 92610 EVALUATE SWALLOWING FUNCTION: CPT | Mod: GN

## 2024-03-23 PROCEDURE — 80053 COMPREHEN METABOLIC PANEL: CPT

## 2024-03-23 PROCEDURE — 36415 COLL VENOUS BLD VENIPUNCTURE: CPT

## 2024-03-23 PROCEDURE — 83735 ASSAY OF MAGNESIUM: CPT | Performed by: INTERNAL MEDICINE

## 2024-03-23 PROCEDURE — 2500000001 HC RX 250 WO HCPCS SELF ADMINISTERED DRUGS (ALT 637 FOR MEDICARE OP): Performed by: STUDENT IN AN ORGANIZED HEALTH CARE EDUCATION/TRAINING PROGRAM

## 2024-03-23 PROCEDURE — 85027 COMPLETE CBC AUTOMATED: CPT

## 2024-03-23 PROCEDURE — 2500000004 HC RX 250 GENERAL PHARMACY W/ HCPCS (ALT 636 FOR OP/ED)

## 2024-03-23 PROCEDURE — C9113 INJ PANTOPRAZOLE SODIUM, VIA: HCPCS

## 2024-03-23 RX ORDER — CHLORDIAZEPOXIDE HYDROCHLORIDE 10 MG/1
10 CAPSULE, GELATIN COATED ORAL 3 TIMES DAILY
Qty: 21 CAPSULE | Refills: 0 | Status: SHIPPED | OUTPATIENT
Start: 2024-03-23 | End: 2024-03-23 | Stop reason: SDUPTHER

## 2024-03-23 RX ORDER — CHLORDIAZEPOXIDE HYDROCHLORIDE 10 MG/1
10 CAPSULE, GELATIN COATED ORAL 3 TIMES DAILY
Qty: 21 CAPSULE | Refills: 0 | Status: SHIPPED | OUTPATIENT
Start: 2024-03-23 | End: 2024-03-30

## 2024-03-23 RX ORDER — CHLORDIAZEPOXIDE HYDROCHLORIDE 5 MG/1
10 CAPSULE, GELATIN COATED ORAL 3 TIMES DAILY
Status: DISCONTINUED | OUTPATIENT
Start: 2024-03-23 | End: 2024-03-23 | Stop reason: HOSPADM

## 2024-03-23 RX ADMIN — CHLORDIAZEPOXIDE HYDROCHLORIDE 10 MG: 5 CAPSULE ORAL at 12:45

## 2024-03-23 RX ADMIN — PANTOPRAZOLE SODIUM 40 MG: 40 INJECTION, POWDER, FOR SOLUTION INTRAVENOUS at 08:32

## 2024-03-23 RX ADMIN — PHENOBARBITAL SODIUM 32.5 MG: 65 INJECTION INTRAMUSCULAR at 08:32

## 2024-03-23 RX ADMIN — PHENOBARBITAL SODIUM 32.5 MG: 65 INJECTION INTRAMUSCULAR at 00:37

## 2024-03-23 RX ADMIN — DEXMEDETOMIDINE HYDROCHLORIDE 0.6 MCG/KG/HR: 400 INJECTION INTRAVENOUS at 08:37

## 2024-03-23 RX ADMIN — DEXMEDETOMIDINE HYDROCHLORIDE 1 MCG/KG/HR: 400 INJECTION INTRAVENOUS at 03:55

## 2024-03-23 ASSESSMENT — LIFESTYLE VARIABLES
PAROXYSMAL SWEATS: BARELY PERCEPTIBLE SWEATING, PALMS MOIST
NAUSEA AND VOMITING: NO NAUSEA AND NO VOMITING
VISUAL DISTURBANCES: NOT PRESENT
ANXIETY: MILDLY ANXIOUS
AGITATION: NORMAL ACTIVITY
ORIENTATION AND CLOUDING OF SENSORIUM: DISORIENTED FOR DATE BY MORE THAN 2 CALENDAR DAYS
AUDITORY DISTURBANCES: NOT PRESENT
TREMOR: NO TREMOR
TOTAL SCORE: 6
TOTAL SCORE: 4
AGITATION: NORMAL ACTIVITY
HEADACHE, FULLNESS IN HEAD: NOT PRESENT
ORIENTATION AND CLOUDING OF SENSORIUM: DISORIENTED FOR DATE BY MORE THAN 2 CALENDAR DAYS
VISUAL DISTURBANCES: NOT PRESENT
ANXIETY: MILDLY ANXIOUS
ANXIETY: MILDLY ANXIOUS
VISUAL DISTURBANCES: NOT PRESENT
NAUSEA AND VOMITING: NO NAUSEA AND NO VOMITING
NAUSEA AND VOMITING: NO NAUSEA AND NO VOMITING
ANXIETY: NO ANXIETY, AT EASE
AUDITORY DISTURBANCES: NOT PRESENT
TOTAL SCORE: 6
AUDITORY DISTURBANCES: NOT PRESENT
PAROXYSMAL SWEATS: BARELY PERCEPTIBLE SWEATING, PALMS MOIST
ORIENTATION AND CLOUDING OF SENSORIUM: DISORIENTED FOR DATE BY MORE THAN 2 CALENDAR DAYS
BLOOD PRESSURE: 148/89
PAROXYSMAL SWEATS: BARELY PERCEPTIBLE SWEATING, PALMS MOIST
HEADACHE, FULLNESS IN HEAD: NOT PRESENT
ORIENTATION AND CLOUDING OF SENSORIUM: DISORIENTED FOR DATE BY MORE THAN 2 CALENDAR DAYS
TOTAL SCORE: 6
NAUSEA AND VOMITING: NO NAUSEA AND NO VOMITING
PULSE: 86
AUDITORY DISTURBANCES: NOT PRESENT
PAROXYSMAL SWEATS: BARELY PERCEPTIBLE SWEATING, PALMS MOIST
PULSE: 86
HEADACHE, FULLNESS IN HEAD: NOT PRESENT
HEADACHE, FULLNESS IN HEAD: NOT PRESENT
TREMOR: NOT VISIBLE, BUT CAN BE FELT FINGERTIP TO FINGERTIP
VISUAL DISTURBANCES: NOT PRESENT
TREMOR: NOT VISIBLE, BUT CAN BE FELT FINGERTIP TO FINGERTIP
TREMOR: NOT VISIBLE, BUT CAN BE FELT FINGERTIP TO FINGERTIP
AGITATION: NORMAL ACTIVITY
AGITATION: NORMAL ACTIVITY

## 2024-03-23 ASSESSMENT — PAIN SCALES - GENERAL
PAINLEVEL_OUTOF10: 0 - NO PAIN
PAINLEVEL_OUTOF10: 0 - NO PAIN

## 2024-03-23 ASSESSMENT — PAIN - FUNCTIONAL ASSESSMENT
PAIN_FUNCTIONAL_ASSESSMENT: 0-10
PAIN_FUNCTIONAL_ASSESSMENT: 0-10

## 2024-03-23 NOTE — DISCHARGE SUMMARY
Discharge Diagnosis  Alcohol withdrawal syndrome with perceptual disturbance (CMS/HCC)  Acute respiratory insufficiency  Transaminitis  Acute enterocolitis  A-fib      Issues Requiring Follow-Up  Follow-up alcohol rehab    Discharge Meds     Your medication list        START taking these medications        Instructions Last Dose Given Next Dose Due   chlordiazePOXIDE 10 mg capsule  Commonly known as: Librium      Take 1 capsule (10 mg) by mouth 3 times a day for 7 days.              CONTINUE taking these medications        Instructions Last Dose Given Next Dose Due   allopurinol 300 mg tablet  Commonly known as: Zyloprim           colchicine 0.6 mg tablet           melatonin 3 mg tablet           metoprolol tartrate 50 mg tablet  Commonly known as: Lopressor           QUEtiapine 100 mg tablet  Commonly known as: SEROquel           rOPINIRole 4 mg tablet  Commonly known as: Requip                     Where to Get Your Medications        These medications were sent to [facility]/pharmacy #[id number] - [address] AT [place] [id number]      Phone: [phone]   chlordiazePOXIDE 10 mg capsule         Test Results Pending At Discharge  Pending Labs       No current pending labs.            Hospital Course  Neo Villalobos is a 44-year-old male with possible history of alcohol use disorder with history of DTs without seizure, Margoth-Deluca tear, gout, paroxysmal A-fib, COPD, HTN, initially presented to hospital with concerns for alcohol withdrawal and abdominal pain.  When he arrived to the ED, patient's ethanol level was 260, CIWA was 51.  LFTs showed  with .  Bicarb was 15.  He initially received 20 mg of Valium and 10 mg Ativan.  He was sent to ICU and started on Precedex drip.  He was very agitated and refused phenobarbital dosing.  We eventually were able to administer phenobarb tapering dose.  He also had a CTAP which showed enterocolitis  hepatic steatosis and colonic diverticulosis.  Patient was very agitated throughout his stay in the ICU.  He was pulling lines and was aggressive towards staff.  Patient was given multiple doses of Valium and Precedex had to be uptitrated due to aggressiveness and increased agitation.  He was a danger to himself and others.  He became apneic and eventually obtunded and was not protecting his airway.  He was intubated for airway protection.  He was eventually weaned off ventilation and extubated successfully.  Precedex was weaned off.  Phenobarbital tapering dose was completed.  He was taken off any sedation.  He was still however agitated and wanted to leave the hospital.  We decided to start him on Librium 10 mg 3 times daily after finishing phenobarbital.  Patient continued to express agitation and wanted to leave AGAINST MEDICAL ADVICE.  After Dr. Cano and ICU team discussed with the patient and try to advise him to follow the medical plan, he was adamant on leaving.  We will sign the appropriate documentation to allow him to leave AMA.  We will send a prescription of Librium 10 mg 3 times daily for 7 days to the pharmacy.    Pertinent Physical Exam At Time of Discharge  Physical Exam  General: Alert and orientated.  Jittery and agitated.  HEENT:  Normocephalic, atraumatic, mucus membranes moist.   Neck:  Trachea midline.  No JVD.    Chest:  Clear to auscultation bilaterally. No wheezes, rales, or rhonchi.  CV:  Regular rate and rhythm.  Positive S1/S2. No murmur, no gallops, no rubs  Abdomen: Bowel sounds present in all four quadrants, abdomen is soft, non-tender, non-distended.  Extremities:  No lower extremity edema or cyanosis.   Neurological: Intubated, sedated  Skin:  Warm and dry.  Outpatient Follow-Up        Aparna Ortiz MD  PGY1 critical care

## 2024-03-23 NOTE — PROGRESS NOTES
Subjective:  Patient seen and examined at bedside.  Patient feels agitated.  Off Precedex.    Admission history:  Neo Villalobos is a 44 y.o. male with a past medical history of alcohol use disorder with a history of DTs without seizure, Margoth Deluca tear, gout, paroxysmal A-fib, COPD, hypertension presents to [place] ED with concerns for alcohol withdrawal and abdominal pain.  In the ED, patient's EtOH was 260, CIWA 51, , , bicarb 15.  He received 20 mg of Valium, 10 mg Ativan and started on Precedex GGT. patient was agitated and refused phenobarbital although based on chart review, he has received phenobarbital multiple times in the past.  CT A/P showed enterocolitis, hepatic steatosis and colonic diverticulosis.  Due to extreme agitation and danger to self with pulling lines patient was placed on soft restraints.    Past medical history: As above  Past surgical history: Hip replacement 2021  Past social history: Alcohol abuse disorder (per chart review, a fifth vodka per day with multiple relapses after quitting), former smoker per chart review  Past family history: Mother has hypertension, diabetes and heart disease    Review of systems:  10 point review of systems performed and negative except as stated above.    Physical Exam:  General: Alert and orientated.  Jittery and agitated.  HEENT:  Normocephalic, atraumatic, mucus membranes moist.   Neck:  Trachea midline.  No JVD.    Chest:  Clear to auscultation bilaterally. No wheezes, rales, or rhonchi.  CV:  Regular rate and rhythm.  Positive S1/S2. No murmur, no gallops, no rubs  Abdomen: Bowel sounds present in all four quadrants, abdomen is soft, non-tender, non-distended.  Extremities:  No lower extremity edema or cyanosis.   Neurological: Intubated, sedated  Skin:  Warm and dry.    Daily progress:   3/18: Phenobarb 260 mg ordered since load was not given in the ED.  Acute urinary retention, Emanuel catheter inserted.  Increase D5 normal saline  rate to 125 cc/h.  Patient continues to be agitated and yelling intermittently, danger to self with pulling lines, soft restraints renewed.  RFP and magnesium levels twice daily.  3/19: Pt became more agitated this morning and received an extra dose of valium and precedex was up titrated. He became apneic and was obtunded and was not protecting his airway. He was intubated for airway protection. Valium held. Continue phenobarb. Sedation with propofol.  3/20: Patient intermittently agitated and poses a danger to self with pulling lines, soft restraints ordered.  Ordered 260 mg phenobarbital x3.  Versed 4 mg once.  Plan to extubate in the next 24 to 48 hours.  Electrolytes replenished this morning.  Patient was given 1 L LR overnight due to soft pressures, continue to monitor.  Patient not having much urine output today we will continue to monitor this afternoon.  Transition to oral thiamine tomorrow if able.  3/21: Patient less agitated overnight.  Wean sedation as tolerated.  Transition to oral thiamine today.  500 cc bolus ordered, will watch for urine output as patient has not been making a lot of urine.  Will attempt awakening trial tomorrow.  3/22: Patient extubated successfully today.  Fentanyl, propofol discontinued.  Continue to wean Precedex.  Chloraseptic spray ordered for soreness.  Continue phenobarb taper, continue to monitor.  3/23: Off Precedex today.  Patient feels slightly agitated with intermittent tremors present.  We will start him on Librium 10 mg 3 times daily.  Finishes phenobarbital today.  Difficulty swallowing food and pills.  Will get SLP evaluation.  Will transfer to general medicine floor.    Assessment + Plan:     Neuro:  #Acute alcohol intoxication and DTs  #Alcohol withdrawal  #Agitation  #Altered mental status  -Precedex weaned off  -Phenobarb complete  -Librium 10 mg 3 times daily  -Valium as needed  -P.o. intermittently 1  -Folic acid IV  -Advance diet as appropriate  - Patient has  difficulty swallowing.  Will get SLP evaluation    CV:  #History of A-fib  -Metoprolol on hold    Respiratory:  #Acute respiratory insufficiency  -Extubated yesterday.  Respiratory status satisfactory.  No respiratory distress.    GI:  #Transaminitis, downtrending  #Acute enterocolitis  -Secondary to alcoholic hepatitis/steatosis.  Continue to monitor.  LFTs downtrending.  -PPI as tolerated    Endo:  No acute abnormalities    Renal:  No acute abnormalities    Hematological:  No acute abnormalities    ID:  No acute abnormalities    Vent/O2: Extubated.  Does not require oxygen  Lines/Devices: PIV, dawson  Drips: None  ATBx: None  Fluids: none  Diet: Regular  PPX: Protonix, Lovenox  Code status: full code  Dispo: ICU

## 2024-03-23 NOTE — SIGNIFICANT EVENT
Patients IV site infiltrated, patients CIWA scores increased while precedex was on hold. PRN valium IM was added and 5 mg was administered per order while trying to place new access. After 2 RNs were unsuccessful at placing new peripheral IVs, MD Minaya was notified and came to bedside to a right EJ 18 gauge. Precedex was restarted and CIWA scores returned to lower scores.

## 2024-03-23 NOTE — PROGRESS NOTES
Speech-Language Pathology    SLP Adult Inpatient Speech-Language Pathology Clinical Swallow Evaluation    Patient Name: Neo Villalobos  MRN: [id number]  Today's Date: 3/23/2024   Time Calculation  Start Time: 1200  Stop Time: 1218  Time Calculation (min): 18 min         Current Problem:   1. Alcohol withdrawal syndrome with perceptual disturbance (CMS/HCC)          Assessment:  Patient presents with grossly functional oropharyngeal swallowing mechanism, although trials are limited d/t ongoing throat pain/discomfort, likely from intubation, and abdominal pain that began after PO consumption.  Voice is clear and audible.    Patient seated on edge of bed. Patient with upper extremity shakiness/tremors but able to self-feed tsp bites of applesauce and x1 cheyanne cracker. Patient demonstrates adequate oral phase of swallow with adequate bolus control and management. Timely mastication and A-P transit for both solid textures. No evidence of significant oral residue. Patient does endorse some globus sensation, however, residue appears to cleared with independent second dry swallow. Tongue midline on protrusion. On visualization, palate elevates with production of /ah/.    Cup and straw sips of thin water were tolerated well as evidenced by no overt or subtle s/s of aspiration. Patient unable to complete full 3oz water challenge d/t throat pain. Facial grimacing and oral holding appreciated, likely d/t hesitancy to swallow. Patient reports it hurts more to swallow liquids vs solids. Discussed that patient could trial warm beverage such as tea with honey to soothe and alleviate any throat pain; patient stated he will try that.       Recommendations:  Solid Diet Recommendations : Regular (IDDSI Level 7)  Liquid Diet Recommendations: Thin (IDDSI Level 0)  -Patient expresses that he will likely choose naturally soft food items initially d/t throat pain such as soup or mashed potatoes.     Compensatory Swallowing Strategies:  "  Single sips,   Small bites/sips,   Eat/feed slowly,   Upright 90 degrees as possible for all oral intake,  Alternate sip of liquid every few bites    Medication Administration Recommendations: Whole, With Liquid, With Pureed; per RN discretion and patient's comfort level.    Dysphagia Goals: START DATE 3/23/24  Patient will consume prescribed diet without clinical or overt s/s aspiration.   Patient/staff education.      Plan:  SLP Plan: Skilled SLP  SLP Frequency: One time visit  Duration: Current admission  Discussed POC: Patient, Nursing      Subjective   Current Problem:  Concern for dysphagia following intubation      General Visit Information:  Patient Class: Inpatient  Living Environment: Home  Prior to Session Communication: Bedside nurse  [employer] Diet: Regular solids/Thin liquids  Current Diet : NPO    Copied per H+P: \"Neo Villalobos is a 44 y.o. male with a past medical history of alcohol use disorder with a history of DTs without seizure, Margoth Deluca tear, gout, paroxysmal A-fib, COPD, hypertension presents to [facility] ED with concerns for alcohol withdrawal and abdominal pain.  In the ED, patient's EtOH was 260, CIWA 51, , , bicarb 15.  He received 20 mg of Valium, 10 mg Ativan and started on Precedex GGT. patient was agitated and refused phenobarbital although based on chart review, he has received phenobarbital multiple times in the past.  CT A/P showed enterocolitis, hepatic steatosis and colonic diverticulosis.  Due to extreme agitation and danger to self with pulling lines patient was placed on soft restraints.\"    Patient was admitted to the ICU for further management of alcohol withdrawal. Per chart, \"On 3/19, the patient became more agitated and received an additional dose of valium IV and precedex was increased. He became obtunded, apneic and was not protecting his airway despite turning off his precedex. Reversal agent contraindicated in setting of EtOH withdrawal. He was " "intubated for airway protection.\"     Patient was successfully extubated 3/22/23. Patient failed initial nursing swallow screen and kept NPO until formal swallow evaluation completed.    Vital Signs:   Room air, afebrile, 3/19 CXR bibasilar atelectasis, 3/17 Head CT no acute intracranial process.    Patient was intubated 3/19 for airway protection and successfully extubated 3/22/24.      Objective   Patient agreeable to PO trials for swallowing assessment. Complaint of throat pain.    Baseline Assessment:  Respiratory Status: Room air  History of Intubation: Yes  Length of Intubations (days): 3 days  Date extubated: 03/22/24  Behavior/Cognition: Alert, Cooperative, Pleasant mood      Pain:   Throat pain; nursing aware      Oral/Motor Assessment:  Dentition: Adequate/Natural      Clinical Observations:  Was The 3 oz Swallow Protocol Completed: Attempted, however, patient unable to swallow entire 3 oz as a result of throat pain.      Inpatient:  Education Comments  Results/recommendations from swallowing assessment were reviewed with patient and staff. All parties voiced understanding.  "

## 2024-03-23 NOTE — DISCHARGE INSTRUCTIONS
Thank you for attending [facility].    1.  We sent a 7-day prescription of Librium 10 mg to be taken 3 times daily to help with alcohol withdrawal symptoms.  2.  We recommend you to attend alcohol rehab such as [facility] to help with your alcohol addiction.

## 2024-03-23 NOTE — CARE PLAN
The patient's goals for the shift include      The clinical goals for the shift include Patient will be monitored for withdrawl symptoms, comfort/safety      Problem: Skin  Goal: Decreased wound size/increased tissue granulation at next dressing change  Outcome: Progressing  Goal: Participates in plan/prevention/treatment measures  Outcome: Progressing  Goal: Prevent/manage excess moisture  Outcome: Progressing  Goal: Prevent/minimize sheer/friction injuries  Outcome: Progressing  Goal: Promote/optimize nutrition  Outcome: Progressing     Problem: Fall/Injury  Goal: Not fall by end of shift  Outcome: Progressing  Goal: Be free from injury by end of the shift  Outcome: Progressing  Goal: Verbalize understanding of personal risk factors for fall in the hospital  Outcome: Progressing  Goal: Verbalize understanding of risk factor reduction measures to prevent injury from fall in the home  Outcome: Progressing     Problem: Pain  Goal: Takes deep breaths with improved pain control throughout the shift  Outcome: Progressing  Goal: Turns in bed with improved pain control throughout the shift  Outcome: Progressing  Goal: Walks with improved pain control throughout the shift  Outcome: Progressing  Goal: Performs ADL's with improved pain control throughout shift  Outcome: Progressing  Goal: Participates in PT with improved pain control throughout the shift  Outcome: Progressing     Problem: Pain  Goal: My pain/discomfort is manageable  Outcome: Progressing     Problem: Safety  Goal: Patient will be injury free during hospitalization  Outcome: Progressing  Goal: I will remain free of falls  Outcome: Progressing  Flowsheets (Taken 3/23/2024 0624)  Resident will remain free of falls:   Apply bed/chair alarms as appropriate   Assist with toileting as orderd   Maintain bed at position as ordered (chair height, low bed)   Assess and monitor medications that may increase fall risk   Accompany resident as ordered (ex. 1:1, stand-by  assist, dayroom monitoring, 15 minute checks, line of sight)   Visual checks per facility policy     Problem: Daily Care  Goal: Daily care needs are met  Outcome: Progressing  Flowsheets (Taken 3/23/2024 0624)  Daily care needs are met:   Assess and monitor ability to perform self care and identify potential discharge needs   Assist patient with activities of daily living as needed   Provide mouth care   Assess skin integrity/risk for skin breakdown   Encourage independent activity per ability   Include patient/family/caregiver in decisions related to daily care     Problem: Psychosocial Needs  Goal: Demonstrates ability to cope with hospitalization/illness  Outcome: Progressing  Goal: Collaborate with me, my family, and caregiver to identify my specific goals  Outcome: Progressing     Problem: Discharge Barriers  Goal: My discharge needs are met  Outcome: Progressing     Problem: Resident has compromised skin integrity  Goal: I will maintain or improve skin integrity  Outcome: Progressing     Problem: Safety - Medical Restraint  Goal: Remains free of injury from restraints (Restraint for Interference with Medical Device)  Outcome: Progressing  Goal: Free from restraint(s) (Restraint for Interference with Medical Device)  Outcome: Progressing

## 2024-03-23 NOTE — CONSULTS
"Nutrition Follow Up Assessment:     Reason for Assessment  Reason for Assessment:  (follow up)    Visit Reason: ETOH withdrawal;  abd pain  Recommendation(s):  Continue regular diet as long as pt eats well, monitor for possible supplement need         Nutrition Assessment    Nutrition History  Food and Nutrient History: Pt was intubated and on a TF for a short time.  He was extubated today.  He has been taking meds well per nursing and was started on a regular diet today.  Vitamin/Herbal Supplement Use: multivit; folic acid; thiamine      Per Flowsheet Percent Meal intake:    Dietary Orders (From admission, onward)       Start     Ordered    03/22/24 1313  Adult diet Regular  Diet effective now        Question:  Diet type  Answer:  Regular    03/22/24 1312                  Completely dependent  Results from last 7 days   Lab Units 03/22/24  0510 03/21/24  0536 03/20/24  0535 03/19/24  1906 03/19/24  0507 03/18/24  1811   GLUCOSE mg/dL 96 96 113* 116* 128* 101*   SODIUM mmol/L 138 137 142 139 135* 136   POTASSIUM mmol/L 3.6 3.8 3.1* 3.7 3.5 3.9   CHLORIDE mmol/L 105 106 106 106 102 102   CO2 mmol/L 24 21 27 22 23 21   BUN mg/dL 10 7 3* 3* 4* 2*   CREATININE mg/dL 0.67 0.84 0.67 0.64 0.70 0.72   EGFR mL/min/1.73m*2 >90 >90 >90 >90 >90 >90   CALCIUM mg/dL 7.2* 7.1* 7.0* 7.5* 7.3* 7.1*   PHOSPHORUS mg/dL  --   --   --  3.3 2.4* 1.9*   MAGNESIUM mg/dL 1.57*  --   --  1.71  --  1.53*     No results found for: \"HGBA1C\"  Results from last 7 days   Lab Units 03/22/24  0507 03/22/24  0048 03/21/24  1734 03/21/24  0532 03/21/24  0013 03/20/24  1743 03/20/24  1734 03/20/24  1148   POCT GLUCOSE mg/dL 90 97 102* 111* 92 71* 66* 97     GI per flowsheet:  Gastrointestinal  Gastrointestinal (WDL): Exceptions to WDL  Bowel Sounds (All Quadrants): Hypoactive  Passing Flatus: No  Gastrointestinal Symptoms: Constipation  Constipation Precipitating Factors: Medication (Iron supplements, opioids, antidepressants, etc.)  Current " "Interventions: Stimulant laxative  Last bowel movement documented:    PMH:   Allergies: Clonidine, Sulfamethoxazole-trimethoprim, and Gabapentin enacarbil     Anthropometrics:  Height: 175.3 cm (5' 9.02\")  Weight: 108 kg (238 lb 1.6 oz)  BMI (Calculated): 35.14  IBW: 72 kg  %IBW: 150 %      Weight History / % Weight Change: wt is down 1 kg             Estimated Nutritional Needs:  Method for Estimating Needs: 0081-0540   26-30 albino kg of IBW    Method for Estimating Needs:   1.1-1.5 gm kg of IBW    Method for Estimating Needs: 6965-0630   20-30 ml kg of IBW    Nutrition Focused Physical Findings:   Orbital Fat Pads: Defer (not appropriate)        Pain Score: 0 - No pain  Critical-Care Pain Observation Score:  [5]      Nutrition Diagnosis        Patient has Nutrition Diagnosis: Yes  Resolved  Nutrition Diagnosis 1: Inadequate oral intake  Related to (1): decreased ability to consume sufficient energy  As Evidenced by (1): pt is intubated and a TF is being initiated          Nutrition Interventions/Recommendations   Interventions        Individualized Nutrition Prescription Provided for : Continue regular diet as long as pt eats well,  monitor for possible supplement need    Nutrition Monitoring and Evaluation   Biochemical Data, Medical Tests and Procedures  Monitoring and Evaluation Plan: Electrolyte/renal panel, Glucose/endocrine profile  Food/Nutrient Related History Monitoring  Monitoring and Evaluation Plan: Energy intake, Fluid intake, Amount of food  Enteral and Parenteral Nutrition Intake: Enteral nutrition formula/solution, Enteral nutrition intake    Progress towards goals: Partially Met    Education Documentation  No documentation found.      Time Spent (min): 30 minutes  Last Date of Nutrition Visit: 03/22/24  Nutrition Follow-Up Needed?: 3-5 days  Follow up Comment: 3/27  CORNELIA   "

## 2024-07-07 ENCOUNTER — APPOINTMENT (OUTPATIENT)
Dept: RADIOLOGY | Facility: HOSPITAL | Age: 44
DRG: 894 | End: 2024-07-07
Payer: COMMERCIAL

## 2024-07-07 ENCOUNTER — HOSPITAL ENCOUNTER (INPATIENT)
Facility: HOSPITAL | Age: 44
DRG: 894 | End: 2024-07-07
Attending: STUDENT IN AN ORGANIZED HEALTH CARE EDUCATION/TRAINING PROGRAM | Admitting: ANESTHESIOLOGY
Payer: COMMERCIAL

## 2024-07-07 ENCOUNTER — APPOINTMENT (OUTPATIENT)
Dept: CARDIOLOGY | Facility: HOSPITAL | Age: 44
DRG: 894 | End: 2024-07-07
Payer: COMMERCIAL

## 2024-07-07 VITALS
DIASTOLIC BLOOD PRESSURE: 87 MMHG | SYSTOLIC BLOOD PRESSURE: 134 MMHG | HEART RATE: 71 BPM | RESPIRATION RATE: 18 BRPM | OXYGEN SATURATION: 100 % | TEMPERATURE: 99 F | BODY MASS INDEX: 36.45 KG/M2 | WEIGHT: 246.91 LBS

## 2024-07-07 DIAGNOSIS — F10.920 ALCOHOLIC INTOXICATION WITHOUT COMPLICATION (CMS-HCC): Primary | ICD-10-CM

## 2024-07-07 DIAGNOSIS — S22.42XA MULTIPLE FRACTURES OF RIBS, LEFT SIDE, INITIAL ENCOUNTER FOR CLOSED FRACTURE: ICD-10-CM

## 2024-07-07 DIAGNOSIS — I42.6 DILATED CARDIOMYOPATHY SECONDARY TO ALCOHOL (MULTI): ICD-10-CM

## 2024-07-07 DIAGNOSIS — T14.90XA TRAUMA: ICD-10-CM

## 2024-07-07 LAB
ABO GROUP (TYPE) IN BLOOD: NORMAL
ALBUMIN SERPL BCP-MCNC: 3.7 G/DL (ref 3.4–5)
ALBUMIN SERPL BCP-MCNC: 3.9 G/DL (ref 3.4–5)
ALBUMIN SERPL BCP-MCNC: 4.5 G/DL (ref 3.4–5)
ALP SERPL-CCNC: 67 U/L (ref 33–120)
ALP SERPL-CCNC: 84 U/L (ref 33–120)
ALT SERPL W P-5'-P-CCNC: 36 U/L (ref 10–52)
ALT SERPL W P-5'-P-CCNC: 45 U/L (ref 10–52)
AMPHETAMINES UR QL SCN: ABNORMAL
ANION GAP BLDV CALCULATED.4IONS-SCNC: 7 MMOL/L (ref 10–25)
ANION GAP SERPL CALC-SCNC: 14 MMOL/L (ref 10–20)
ANION GAP SERPL CALC-SCNC: 18 MMOL/L (ref 10–20)
ANION GAP SERPL CALC-SCNC: 19 MMOL/L (ref 10–20)
ANTIBODY SCREEN: NORMAL
ARTERIAL PATENCY WRIST A: POSITIVE
AST SERPL W P-5'-P-CCNC: 43 U/L (ref 9–39)
AST SERPL W P-5'-P-CCNC: 62 U/L (ref 9–39)
BARBITURATES UR QL SCN: ABNORMAL
BASE EXCESS BLDA CALC-SCNC: -4.6 MMOL/L (ref -2–3)
BASE EXCESS BLDV CALC-SCNC: 3.7 MMOL/L (ref -2–3)
BASOPHILS # BLD AUTO: 0.09 X10*3/UL (ref 0–0.1)
BASOPHILS NFR BLD AUTO: 1 %
BENZODIAZ UR QL SCN: ABNORMAL
BILIRUB SERPL-MCNC: 0.6 MG/DL (ref 0–1.2)
BILIRUB SERPL-MCNC: 0.8 MG/DL (ref 0–1.2)
BODY TEMPERATURE: 37 DEGREES CELSIUS
BODY TEMPERATURE: 37 DEGREES CELSIUS
BUN SERPL-MCNC: 16 MG/DL (ref 6–23)
BUN SERPL-MCNC: 19 MG/DL (ref 6–23)
BUN SERPL-MCNC: 20 MG/DL (ref 6–23)
BZE UR QL SCN: ABNORMAL
CA-I BLDV-SCNC: 1.01 MMOL/L (ref 1.1–1.33)
CALCIUM SERPL-MCNC: 7.8 MG/DL (ref 8.6–10.3)
CALCIUM SERPL-MCNC: 7.8 MG/DL (ref 8.6–10.3)
CALCIUM SERPL-MCNC: 8.3 MG/DL (ref 8.6–10.3)
CANNABINOIDS UR QL SCN: ABNORMAL
CHLORIDE BLDV-SCNC: 104 MMOL/L (ref 98–107)
CHLORIDE SERPL-SCNC: 103 MMOL/L (ref 98–107)
CHLORIDE SERPL-SCNC: 104 MMOL/L (ref 98–107)
CHLORIDE SERPL-SCNC: 105 MMOL/L (ref 98–107)
CO2 SERPL-SCNC: 18 MMOL/L (ref 21–32)
CO2 SERPL-SCNC: 19 MMOL/L (ref 21–32)
CO2 SERPL-SCNC: 25 MMOL/L (ref 21–32)
CREAT SERPL-MCNC: 0.76 MG/DL (ref 0.5–1.3)
CREAT SERPL-MCNC: 0.83 MG/DL (ref 0.5–1.3)
CREAT SERPL-MCNC: 0.91 MG/DL (ref 0.5–1.3)
EGFRCR SERPLBLD CKD-EPI 2021: >90 ML/MIN/1.73M*2
EOSINOPHIL # BLD AUTO: 0.11 X10*3/UL (ref 0–0.7)
EOSINOPHIL NFR BLD AUTO: 1.2 %
ERYTHROCYTE [DISTWIDTH] IN BLOOD BY AUTOMATED COUNT: 14.1 % (ref 11.5–14.5)
ERYTHROCYTE [DISTWIDTH] IN BLOOD BY AUTOMATED COUNT: 14.5 % (ref 11.5–14.5)
ETHANOL SERPL-MCNC: 258 MG/DL
FENTANYL+NORFENTANYL UR QL SCN: ABNORMAL
FREQUENCY (BPM): 18 BPM
FREQUENCY (BPM): 34 BPM
GLUCOSE BLD MANUAL STRIP-MCNC: 91 MG/DL (ref 74–99)
GLUCOSE BLDV-MCNC: 122 MG/DL (ref 74–99)
GLUCOSE SERPL-MCNC: 104 MG/DL (ref 74–99)
GLUCOSE SERPL-MCNC: 105 MG/DL (ref 74–99)
GLUCOSE SERPL-MCNC: 93 MG/DL (ref 74–99)
HCO3 BLDA-SCNC: 22.9 MMOL/L (ref 22–26)
HCO3 BLDV-SCNC: 29.2 MMOL/L (ref 22–26)
HCT VFR BLD AUTO: 34.5 % (ref 41–52)
HCT VFR BLD AUTO: 41.9 % (ref 41–52)
HCT VFR BLD EST: 46 % (ref 41–52)
HGB BLD-MCNC: 12.1 G/DL (ref 13.5–17.5)
HGB BLD-MCNC: 14 G/DL (ref 13.5–17.5)
HGB BLDV-MCNC: 15.4 G/DL (ref 13.5–17.5)
HOLD SPECIMEN: NORMAL
IMM GRANULOCYTES # BLD AUTO: 0.17 X10*3/UL (ref 0–0.7)
IMM GRANULOCYTES NFR BLD AUTO: 1.8 % (ref 0–0.9)
INHALED O2 CONCENTRATION: 50 %
INHALED O2 CONCENTRATION: 50 %
INR PPP: 1.1 (ref 0.9–1.1)
LACTATE BLDV-SCNC: 2.5 MMOL/L (ref 0.4–2)
LACTATE SERPL-SCNC: 1.1 MMOL/L (ref 0.4–2)
LACTATE SERPL-SCNC: 4.9 MMOL/L (ref 0.4–2)
LACTATE SERPL-SCNC: 5.1 MMOL/L (ref 0.4–2)
LACTATE SERPL-SCNC: 5.7 MMOL/L (ref 0.4–2)
LACTATE SERPL-SCNC: 6.8 MMOL/L (ref 0.4–2)
LIPASE SERPL-CCNC: 26 U/L (ref 9–82)
LYMPHOCYTES # BLD AUTO: 2.97 X10*3/UL (ref 1.2–4.8)
LYMPHOCYTES NFR BLD AUTO: 31.5 %
MAGNESIUM SERPL-MCNC: 1.77 MG/DL (ref 1.6–2.4)
MCH RBC QN AUTO: 36.2 PG (ref 26–34)
MCH RBC QN AUTO: 36.9 PG (ref 26–34)
MCHC RBC AUTO-ENTMCNC: 33.4 G/DL (ref 32–36)
MCHC RBC AUTO-ENTMCNC: 35.1 G/DL (ref 32–36)
MCV RBC AUTO: 105 FL (ref 80–100)
MCV RBC AUTO: 108 FL (ref 80–100)
METHADONE UR QL SCN: ABNORMAL
MONOCYTES # BLD AUTO: 0.59 X10*3/UL (ref 0.1–1)
MONOCYTES NFR BLD AUTO: 6.3 %
NEUTROPHILS # BLD AUTO: 5.5 X10*3/UL (ref 1.2–7.7)
NEUTROPHILS NFR BLD AUTO: 58.2 %
NRBC BLD-RTO: 0 /100 WBCS (ref 0–0)
NRBC BLD-RTO: 0.2 /100 WBCS (ref 0–0)
OPIATES UR QL SCN: ABNORMAL
OXYCODONE+OXYMORPHONE UR QL SCN: ABNORMAL
OXYHGB MFR BLDA: 93.8 % (ref 94–98)
OXYHGB MFR BLDV: 77.2 % (ref 45–75)
PCO2 BLDA: 51 MM HG (ref 38–42)
PCO2 BLDV: 46 MM HG (ref 41–51)
PCP UR QL SCN: ABNORMAL
PEEP CMH2O: 5 CM H2O
PEEP CMH2O: 5 CM H2O
PH BLDA: 7.26 PH (ref 7.38–7.42)
PH BLDV: 7.41 PH (ref 7.33–7.43)
PHOSPHATE SERPL-MCNC: 3.6 MG/DL (ref 2.5–4.9)
PLATELET # BLD AUTO: 189 X10*3/UL (ref 150–450)
PLATELET # BLD AUTO: 271 X10*3/UL (ref 150–450)
PO2 BLDA: 93 MM HG (ref 85–95)
PO2 BLDV: 49 MM HG (ref 35–45)
POTASSIUM BLDV-SCNC: 4.4 MMOL/L (ref 3.5–5.3)
POTASSIUM SERPL-SCNC: 3.7 MMOL/L (ref 3.5–5.3)
POTASSIUM SERPL-SCNC: 4.2 MMOL/L (ref 3.5–5.3)
POTASSIUM SERPL-SCNC: 4.6 MMOL/L (ref 3.5–5.3)
PROT SERPL-MCNC: 6.1 G/DL (ref 6.4–8.2)
PROT SERPL-MCNC: 7.2 G/DL (ref 6.4–8.2)
PROTHROMBIN TIME: 11.9 SECONDS (ref 9.8–12.8)
RBC # BLD AUTO: 3.28 X10*6/UL (ref 4.5–5.9)
RBC # BLD AUTO: 3.87 X10*6/UL (ref 4.5–5.9)
RH FACTOR (ANTIGEN D): NORMAL
SAO2 % BLDA: 98 % (ref 94–100)
SAO2 % BLDV: 79 % (ref 45–75)
SODIUM BLDV-SCNC: 136 MMOL/L (ref 136–145)
SODIUM SERPL-SCNC: 136 MMOL/L (ref 136–145)
SODIUM SERPL-SCNC: 138 MMOL/L (ref 136–145)
SODIUM SERPL-SCNC: 138 MMOL/L (ref 136–145)
SPECIMEN DRAWN FROM PATIENT: ABNORMAL
SPONTANEOUS TIDAL VOLUME: 554 ML
SPONTANEOUS TIDAL VOLUME: 664 ML
TIDAL VOLUME: 450 ML
TIDAL VOLUME: 550 ML
TOTAL MINUTE VOLUME: 11.7 LITER
TOTAL MINUTE VOLUME: 13.4 LITER
VENTILATOR MODE: ABNORMAL
VENTILATOR MODE: ABNORMAL
VENTILATOR RATE: 118 BPM
VENTILATOR RATE: 21 BPM
WBC # BLD AUTO: 5.2 X10*3/UL (ref 4.4–11.3)
WBC # BLD AUTO: 9.4 X10*3/UL (ref 4.4–11.3)

## 2024-07-07 PROCEDURE — 83605 ASSAY OF LACTIC ACID: CPT

## 2024-07-07 PROCEDURE — 87081 CULTURE SCREEN ONLY: CPT | Mod: PARLAB | Performed by: ANESTHESIOLOGY

## 2024-07-07 PROCEDURE — 2500000004 HC RX 250 GENERAL PHARMACY W/ HCPCS (ALT 636 FOR OP/ED): Performed by: ANESTHESIOLOGY

## 2024-07-07 PROCEDURE — 5A1935Z RESPIRATORY VENTILATION, LESS THAN 24 CONSECUTIVE HOURS: ICD-10-PCS | Performed by: STUDENT IN AN ORGANIZED HEALTH CARE EDUCATION/TRAINING PROGRAM

## 2024-07-07 PROCEDURE — 2500000005 HC RX 250 GENERAL PHARMACY W/O HCPCS: Performed by: STUDENT IN AN ORGANIZED HEALTH CARE EDUCATION/TRAINING PROGRAM

## 2024-07-07 PROCEDURE — 71045 X-RAY EXAM CHEST 1 VIEW: CPT

## 2024-07-07 PROCEDURE — 83735 ASSAY OF MAGNESIUM: CPT | Performed by: STUDENT IN AN ORGANIZED HEALTH CARE EDUCATION/TRAINING PROGRAM

## 2024-07-07 PROCEDURE — 5A1945Z RESPIRATORY VENTILATION, 24-96 CONSECUTIVE HOURS: ICD-10-PCS | Performed by: STUDENT IN AN ORGANIZED HEALTH CARE EDUCATION/TRAINING PROGRAM

## 2024-07-07 PROCEDURE — 85027 COMPLETE CBC AUTOMATED: CPT

## 2024-07-07 PROCEDURE — 84132 ASSAY OF SERUM POTASSIUM: CPT | Performed by: ANESTHESIOLOGY

## 2024-07-07 PROCEDURE — 82077 ASSAY SPEC XCP UR&BREATH IA: CPT | Performed by: STUDENT IN AN ORGANIZED HEALTH CARE EDUCATION/TRAINING PROGRAM

## 2024-07-07 PROCEDURE — 80069 RENAL FUNCTION PANEL: CPT | Mod: CCI | Performed by: ANESTHESIOLOGY

## 2024-07-07 PROCEDURE — 2500000004 HC RX 250 GENERAL PHARMACY W/ HCPCS (ALT 636 FOR OP/ED)

## 2024-07-07 PROCEDURE — 2550000001 HC RX 255 CONTRASTS: Performed by: STUDENT IN AN ORGANIZED HEALTH CARE EDUCATION/TRAINING PROGRAM

## 2024-07-07 PROCEDURE — 82810 BLOOD GASES O2 SAT ONLY: CPT | Performed by: STUDENT IN AN ORGANIZED HEALTH CARE EDUCATION/TRAINING PROGRAM

## 2024-07-07 PROCEDURE — 82805 BLOOD GASES W/O2 SATURATION: CPT | Performed by: STUDENT IN AN ORGANIZED HEALTH CARE EDUCATION/TRAINING PROGRAM

## 2024-07-07 PROCEDURE — 85610 PROTHROMBIN TIME: CPT | Performed by: STUDENT IN AN ORGANIZED HEALTH CARE EDUCATION/TRAINING PROGRAM

## 2024-07-07 PROCEDURE — 80053 COMPREHEN METABOLIC PANEL: CPT

## 2024-07-07 PROCEDURE — 70486 CT MAXILLOFACIAL W/O DYE: CPT

## 2024-07-07 PROCEDURE — 94002 VENT MGMT INPAT INIT DAY: CPT

## 2024-07-07 PROCEDURE — 99222 1ST HOSP IP/OBS MODERATE 55: CPT | Performed by: SURGERY

## 2024-07-07 PROCEDURE — 83605 ASSAY OF LACTIC ACID: CPT | Performed by: STUDENT IN AN ORGANIZED HEALTH CARE EDUCATION/TRAINING PROGRAM

## 2024-07-07 PROCEDURE — 36600 WITHDRAWAL OF ARTERIAL BLOOD: CPT

## 2024-07-07 PROCEDURE — 80307 DRUG TEST PRSMV CHEM ANLYZR: CPT

## 2024-07-07 PROCEDURE — 94799 UNLISTED PULMONARY SVC/PX: CPT

## 2024-07-07 PROCEDURE — 82947 ASSAY GLUCOSE BLOOD QUANT: CPT

## 2024-07-07 PROCEDURE — 90715 TDAP VACCINE 7 YRS/> IM: CPT | Performed by: STUDENT IN AN ORGANIZED HEALTH CARE EDUCATION/TRAINING PROGRAM

## 2024-07-07 PROCEDURE — 70486 CT MAXILLOFACIAL W/O DYE: CPT | Performed by: RADIOLOGY

## 2024-07-07 PROCEDURE — 36556 INSERT NON-TUNNEL CV CATH: CPT | Performed by: ANESTHESIOLOGY

## 2024-07-07 PROCEDURE — G0390 TRAUMA RESPONS W/HOSP CRITI: HCPCS

## 2024-07-07 PROCEDURE — 71260 CT THORAX DX C+: CPT

## 2024-07-07 PROCEDURE — 51702 INSERT TEMP BLADDER CATH: CPT

## 2024-07-07 PROCEDURE — 2500000004 HC RX 250 GENERAL PHARMACY W/ HCPCS (ALT 636 FOR OP/ED): Performed by: REGISTERED NURSE

## 2024-07-07 PROCEDURE — 31500 INSERT EMERGENCY AIRWAY: CPT | Performed by: STUDENT IN AN ORGANIZED HEALTH CARE EDUCATION/TRAINING PROGRAM

## 2024-07-07 PROCEDURE — 85025 COMPLETE CBC W/AUTO DIFF WBC: CPT | Performed by: STUDENT IN AN ORGANIZED HEALTH CARE EDUCATION/TRAINING PROGRAM

## 2024-07-07 PROCEDURE — 99221 1ST HOSP IP/OBS SF/LOW 40: CPT | Performed by: REGISTERED NURSE

## 2024-07-07 PROCEDURE — 83690 ASSAY OF LIPASE: CPT | Performed by: REGISTERED NURSE

## 2024-07-07 PROCEDURE — 99291 CRITICAL CARE FIRST HOUR: CPT

## 2024-07-07 PROCEDURE — 36415 COLL VENOUS BLD VENIPUNCTURE: CPT | Performed by: STUDENT IN AN ORGANIZED HEALTH CARE EDUCATION/TRAINING PROGRAM

## 2024-07-07 PROCEDURE — 86850 RBC ANTIBODY SCREEN: CPT | Performed by: STUDENT IN AN ORGANIZED HEALTH CARE EDUCATION/TRAINING PROGRAM

## 2024-07-07 PROCEDURE — 72125 CT NECK SPINE W/O DYE: CPT | Performed by: RADIOLOGY

## 2024-07-07 PROCEDURE — 99291 CRITICAL CARE FIRST HOUR: CPT | Performed by: STUDENT IN AN ORGANIZED HEALTH CARE EDUCATION/TRAINING PROGRAM

## 2024-07-07 PROCEDURE — 90471 IMMUNIZATION ADMIN: CPT | Performed by: STUDENT IN AN ORGANIZED HEALTH CARE EDUCATION/TRAINING PROGRAM

## 2024-07-07 PROCEDURE — 0BH17EZ INSERTION OF ENDOTRACHEAL AIRWAY INTO TRACHEA, VIA NATURAL OR ARTIFICIAL OPENING: ICD-10-PCS | Performed by: STUDENT IN AN ORGANIZED HEALTH CARE EDUCATION/TRAINING PROGRAM

## 2024-07-07 PROCEDURE — 84075 ASSAY ALKALINE PHOSPHATASE: CPT | Performed by: STUDENT IN AN ORGANIZED HEALTH CARE EDUCATION/TRAINING PROGRAM

## 2024-07-07 PROCEDURE — 72125 CT NECK SPINE W/O DYE: CPT

## 2024-07-07 PROCEDURE — 72128 CT CHEST SPINE W/O DYE: CPT

## 2024-07-07 PROCEDURE — 2020000001 HC ICU ROOM DAILY

## 2024-07-07 PROCEDURE — 72131 CT LUMBAR SPINE W/O DYE: CPT

## 2024-07-07 PROCEDURE — 70450 CT HEAD/BRAIN W/O DYE: CPT

## 2024-07-07 PROCEDURE — 80053 COMPREHEN METABOLIC PANEL: CPT | Performed by: STUDENT IN AN ORGANIZED HEALTH CARE EDUCATION/TRAINING PROGRAM

## 2024-07-07 PROCEDURE — 71045 X-RAY EXAM CHEST 1 VIEW: CPT | Mod: FOREIGN READ | Performed by: RADIOLOGY

## 2024-07-07 PROCEDURE — 74177 CT ABD & PELVIS W/CONTRAST: CPT

## 2024-07-07 PROCEDURE — 72170 X-RAY EXAM OF PELVIS: CPT

## 2024-07-07 PROCEDURE — 84100 ASSAY OF PHOSPHORUS: CPT | Performed by: ANESTHESIOLOGY

## 2024-07-07 PROCEDURE — 70450 CT HEAD/BRAIN W/O DYE: CPT | Performed by: RADIOLOGY

## 2024-07-07 PROCEDURE — 93005 ELECTROCARDIOGRAM TRACING: CPT

## 2024-07-07 PROCEDURE — 31500 INSERT EMERGENCY AIRWAY: CPT

## 2024-07-07 PROCEDURE — 96374 THER/PROPH/DIAG INJ IV PUSH: CPT | Mod: 59

## 2024-07-07 PROCEDURE — 2500000001 HC RX 250 WO HCPCS SELF ADMINISTERED DRUGS (ALT 637 FOR MEDICARE OP): Performed by: ANESTHESIOLOGY

## 2024-07-07 PROCEDURE — 71045 X-RAY EXAM CHEST 1 VIEW: CPT | Performed by: RADIOLOGY

## 2024-07-07 PROCEDURE — 2500000004 HC RX 250 GENERAL PHARMACY W/ HCPCS (ALT 636 FOR OP/ED): Performed by: STUDENT IN AN ORGANIZED HEALTH CARE EDUCATION/TRAINING PROGRAM

## 2024-07-07 PROCEDURE — 37799 UNLISTED PX VASCULAR SURGERY: CPT | Performed by: ANESTHESIOLOGY

## 2024-07-07 PROCEDURE — 36680 INSERT NEEDLE BONE CAVITY: CPT

## 2024-07-07 PROCEDURE — 72170 X-RAY EXAM OF PELVIS: CPT | Mod: FOREIGN READ | Performed by: RADIOLOGY

## 2024-07-07 RX ORDER — PANTOPRAZOLE SODIUM 40 MG/10ML
40 INJECTION, POWDER, LYOPHILIZED, FOR SOLUTION INTRAVENOUS
Status: DISCONTINUED | OUTPATIENT
Start: 2024-07-08 | End: 2024-07-08 | Stop reason: HOSPADM

## 2024-07-07 RX ORDER — HYDROCODONE BITARTRATE AND ACETAMINOPHEN 7.5; 325 MG/15ML; MG/15ML
5 SOLUTION ORAL 4 TIMES DAILY
Status: DISCONTINUED | OUTPATIENT
Start: 2024-07-07 | End: 2024-07-07

## 2024-07-07 RX ORDER — KETAMINE HYDROCHLORIDE 100 MG/ML
100 INJECTION, SOLUTION INTRAMUSCULAR; INTRAVENOUS ONCE
Status: DISCONTINUED | OUTPATIENT
Start: 2024-07-07 | End: 2024-07-08 | Stop reason: HOSPADM

## 2024-07-07 RX ORDER — PHENOBARBITAL SODIUM 65 MG/ML
65 INJECTION, SOLUTION INTRAMUSCULAR; INTRAVENOUS EVERY 8 HOURS
Status: DISCONTINUED | OUTPATIENT
Start: 2024-07-09 | End: 2024-07-08 | Stop reason: HOSPADM

## 2024-07-07 RX ORDER — HYDROCODONE BITARTRATE AND ACETAMINOPHEN 7.5; 325 MG/1; MG/1
1 TABLET ORAL 4 TIMES DAILY
Status: DISCONTINUED | OUTPATIENT
Start: 2024-07-07 | End: 2024-07-07

## 2024-07-07 RX ORDER — PROPOFOL 10 MG/ML
20 INJECTION, EMULSION INTRAVENOUS EVERY 10 MIN PRN
Status: DISCONTINUED | OUTPATIENT
Start: 2024-07-07 | End: 2024-07-08 | Stop reason: HOSPADM

## 2024-07-07 RX ORDER — ESOMEPRAZOLE MAGNESIUM 40 MG/1
40 GRANULE, DELAYED RELEASE ORAL
Status: DISCONTINUED | OUTPATIENT
Start: 2024-07-08 | End: 2024-07-08 | Stop reason: HOSPADM

## 2024-07-07 RX ORDER — PHENOBARBITAL SODIUM 65 MG/ML
32.5 INJECTION, SOLUTION INTRAMUSCULAR; INTRAVENOUS EVERY 8 HOURS
Status: DISCONTINUED | OUTPATIENT
Start: 2024-07-11 | End: 2024-07-08 | Stop reason: HOSPADM

## 2024-07-07 RX ORDER — HYDROCODONE BITARTRATE AND ACETAMINOPHEN 7.5; 325 MG/1; MG/1
1 TABLET ORAL 4 TIMES DAILY PRN
COMMUNITY

## 2024-07-07 RX ORDER — PANTOPRAZOLE SODIUM 40 MG/1
40 TABLET, DELAYED RELEASE ORAL
Status: DISCONTINUED | OUTPATIENT
Start: 2024-07-08 | End: 2024-07-08 | Stop reason: HOSPADM

## 2024-07-07 RX ORDER — PROPOFOL 10 MG/ML
40 INJECTION, EMULSION INTRAVENOUS ONCE
Status: DISCONTINUED | OUTPATIENT
Start: 2024-07-07 | End: 2024-07-08 | Stop reason: HOSPADM

## 2024-07-07 RX ORDER — FOLIC ACID 1 MG/1
1 TABLET ORAL DAILY
Status: DISCONTINUED | OUTPATIENT
Start: 2024-07-07 | End: 2024-07-08 | Stop reason: HOSPADM

## 2024-07-07 RX ORDER — MULTIVIT-MIN/IRON FUM/FOLIC AC 7.5 MG-4
1 TABLET ORAL DAILY
Status: DISCONTINUED | OUTPATIENT
Start: 2024-07-07 | End: 2024-07-08 | Stop reason: HOSPADM

## 2024-07-07 RX ORDER — SODIUM CHLORIDE, SODIUM LACTATE, POTASSIUM CHLORIDE, CALCIUM CHLORIDE 600; 310; 30; 20 MG/100ML; MG/100ML; MG/100ML; MG/100ML
125 INJECTION, SOLUTION INTRAVENOUS CONTINUOUS
Status: DISCONTINUED | OUTPATIENT
Start: 2024-07-07 | End: 2024-07-08 | Stop reason: HOSPADM

## 2024-07-07 RX ORDER — PROPOFOL 10 MG/ML
5-100 INJECTION, EMULSION INTRAVENOUS CONTINUOUS
Status: DISCONTINUED | OUTPATIENT
Start: 2024-07-07 | End: 2024-07-08 | Stop reason: HOSPADM

## 2024-07-07 RX ORDER — ROCURONIUM BROMIDE 10 MG/ML
100 INJECTION, SOLUTION INTRAVENOUS ONCE
Status: DISCONTINUED | OUTPATIENT
Start: 2024-07-07 | End: 2024-07-08 | Stop reason: HOSPADM

## 2024-07-07 RX ORDER — PHENOBARBITAL SODIUM 65 MG/ML
97.5 INJECTION, SOLUTION INTRAMUSCULAR; INTRAVENOUS EVERY 8 HOURS
Status: DISCONTINUED | OUTPATIENT
Start: 2024-07-07 | End: 2024-07-08 | Stop reason: HOSPADM

## 2024-07-07 RX ORDER — ENOXAPARIN SODIUM 100 MG/ML
40 INJECTION SUBCUTANEOUS EVERY 24 HOURS
Status: DISCONTINUED | OUTPATIENT
Start: 2024-07-07 | End: 2024-07-08 | Stop reason: HOSPADM

## 2024-07-07 RX ORDER — FENTANYL CITRATE 50 UG/ML
50 INJECTION, SOLUTION INTRAMUSCULAR; INTRAVENOUS ONCE
Status: COMPLETED | OUTPATIENT
Start: 2024-07-07 | End: 2024-07-07

## 2024-07-07 RX ORDER — TIZANIDINE 4 MG/1
4 TABLET ORAL 3 TIMES DAILY PRN
COMMUNITY

## 2024-07-07 RX ORDER — DEXMEDETOMIDINE HYDROCHLORIDE 4 UG/ML
.2-1.5 INJECTION, SOLUTION INTRAVENOUS CONTINUOUS
Status: DISCONTINUED | OUTPATIENT
Start: 2024-07-07 | End: 2024-07-08 | Stop reason: HOSPADM

## 2024-07-07 RX ORDER — OXYCODONE HCL 5 MG/5 ML
5 SOLUTION, ORAL ORAL 4 TIMES DAILY
Status: DISCONTINUED | OUTPATIENT
Start: 2024-07-07 | End: 2024-07-08 | Stop reason: HOSPADM

## 2024-07-07 RX ORDER — HYDROMORPHONE HYDROCHLORIDE 0.2 MG/ML
0.2 INJECTION INTRAMUSCULAR; INTRAVENOUS; SUBCUTANEOUS EVERY 2 HOUR PRN
Status: DISCONTINUED | OUTPATIENT
Start: 2024-07-07 | End: 2024-07-08 | Stop reason: HOSPADM

## 2024-07-07 RX ORDER — KETAMINE HYDROCHLORIDE 10 MG/ML
INJECTION, SOLUTION INTRAMUSCULAR; INTRAVENOUS CODE/TRAUMA/SEDATION MEDICATION
Status: COMPLETED | OUTPATIENT
Start: 2024-07-07 | End: 2024-07-07

## 2024-07-07 RX ORDER — ROCURONIUM BROMIDE 10 MG/ML
INJECTION, SOLUTION INTRAVENOUS CODE/TRAUMA/SEDATION MEDICATION
Status: COMPLETED | OUTPATIENT
Start: 2024-07-07 | End: 2024-07-07

## 2024-07-07 RX ORDER — LANOLIN ALCOHOL/MO/W.PET/CERES
100 CREAM (GRAM) TOPICAL DAILY
Status: DISCONTINUED | OUTPATIENT
Start: 2024-07-10 | End: 2024-07-08 | Stop reason: HOSPADM

## 2024-07-07 RX ORDER — MAGNESIUM SULFATE HEPTAHYDRATE 40 MG/ML
2 INJECTION, SOLUTION INTRAVENOUS ONCE
Status: COMPLETED | OUTPATIENT
Start: 2024-07-07 | End: 2024-07-07

## 2024-07-07 RX ORDER — PHENOBARBITAL SODIUM 65 MG/ML
260 INJECTION, SOLUTION INTRAMUSCULAR; INTRAVENOUS ONCE
Status: COMPLETED | OUTPATIENT
Start: 2024-07-07 | End: 2024-07-07

## 2024-07-07 RX ADMIN — OXYCODONE HYDROCHLORIDE 5 MG: 5 SOLUTION ORAL at 19:54

## 2024-07-07 RX ADMIN — OXYCODONE HYDROCHLORIDE 5 MG: 5 SOLUTION ORAL at 22:12

## 2024-07-07 RX ADMIN — KETAMINE HYDROCHLORIDE 100 MG: 10 INJECTION, SOLUTION INTRAMUSCULAR; INTRAVENOUS at 04:53

## 2024-07-07 RX ADMIN — PROPOFOL 50 MCG/KG/MIN: 10 INJECTION, EMULSION INTRAVENOUS at 12:10

## 2024-07-07 RX ADMIN — PROPOFOL 5 MCG/KG/MIN: 10 INJECTION, EMULSION INTRAVENOUS at 05:18

## 2024-07-07 RX ADMIN — Medication 50 PERCENT: at 06:38

## 2024-07-07 RX ADMIN — PROPOFOL 75 MCG/KG/MIN: 10 INJECTION, EMULSION INTRAVENOUS at 22:12

## 2024-07-07 RX ADMIN — DEXMEDETOMIDINE HYDROCHLORIDE 1.5 MCG/KG/HR: 400 INJECTION INTRAVENOUS at 22:12

## 2024-07-07 RX ADMIN — THIAMINE HYDROCHLORIDE 500 MG: 100 INJECTION, SOLUTION INTRAMUSCULAR; INTRAVENOUS at 16:28

## 2024-07-07 RX ADMIN — PHENOBARBITAL SODIUM 260 MG: 65 INJECTION INTRAMUSCULAR at 09:53

## 2024-07-07 RX ADMIN — TETANUS TOXOID, REDUCED DIPHTHERIA TOXOID AND ACELLULAR PERTUSSIS VACCINE, ADSORBED 0.5 ML: 5; 2.5; 8; 8; 2.5 SUSPENSION INTRAMUSCULAR at 07:45

## 2024-07-07 RX ADMIN — DEXMEDETOMIDINE HYDROCHLORIDE 0.8 MCG/KG/HR: 400 INJECTION INTRAVENOUS at 09:53

## 2024-07-07 RX ADMIN — SODIUM CHLORIDE, POTASSIUM CHLORIDE, SODIUM LACTATE AND CALCIUM CHLORIDE 1000 ML: 600; 310; 30; 20 INJECTION, SOLUTION INTRAVENOUS at 07:44

## 2024-07-07 RX ADMIN — DEXMEDETOMIDINE HYDROCHLORIDE 1.5 MCG/KG/HR: 400 INJECTION INTRAVENOUS at 19:26

## 2024-07-07 RX ADMIN — HYDROMORPHONE HYDROCHLORIDE 0.4 MG: 1 INJECTION, SOLUTION INTRAMUSCULAR; INTRAVENOUS; SUBCUTANEOUS at 18:16

## 2024-07-07 RX ADMIN — PROPOFOL 50 MCG/KG/MIN: 10 INJECTION, EMULSION INTRAVENOUS at 15:00

## 2024-07-07 RX ADMIN — PROPOFOL 50 MCG/KG/MIN: 10 INJECTION, EMULSION INTRAVENOUS at 16:27

## 2024-07-07 RX ADMIN — ROCURONIUM BROMIDE 100 MG: 10 INJECTION, SOLUTION INTRAVENOUS at 04:58

## 2024-07-07 RX ADMIN — FENTANYL CITRATE 50 MCG: 50 INJECTION INTRAMUSCULAR; INTRAVENOUS at 07:44

## 2024-07-07 RX ADMIN — DEXMEDETOMIDINE HYDROCHLORIDE 0.9 MCG/KG/HR: 400 INJECTION INTRAVENOUS at 16:27

## 2024-07-07 RX ADMIN — IOHEXOL 100 ML: 350 INJECTION, SOLUTION INTRAVENOUS at 06:32

## 2024-07-07 RX ADMIN — PHENOBARBITAL SODIUM 97.5 MG: 65 INJECTION INTRAMUSCULAR at 17:49

## 2024-07-07 RX ADMIN — DEXMEDETOMIDINE HYDROCHLORIDE 0.9 MCG/KG/HR: 400 INJECTION INTRAVENOUS at 12:11

## 2024-07-07 RX ADMIN — DEXMEDETOMIDINE HYDROCHLORIDE 1.5 MCG/KG/HR: 400 INJECTION INTRAVENOUS at 23:55

## 2024-07-07 RX ADMIN — DEXMEDETOMIDINE HYDROCHLORIDE 0.2 MCG/KG/HR: 400 INJECTION INTRAVENOUS at 06:39

## 2024-07-07 RX ADMIN — SODIUM CHLORIDE, POTASSIUM CHLORIDE, SODIUM LACTATE AND CALCIUM CHLORIDE 125 ML/HR: 600; 310; 30; 20 INJECTION, SOLUTION INTRAVENOUS at 17:48

## 2024-07-07 RX ADMIN — MAGNESIUM SULFATE HEPTAHYDRATE 2 G: 40 INJECTION, SOLUTION INTRAVENOUS at 07:46

## 2024-07-07 RX ADMIN — Medication 50 PERCENT: at 08:00

## 2024-07-07 ASSESSMENT — LIFESTYLE VARIABLES
HAVE PEOPLE ANNOYED YOU BY CRITICIZING YOUR DRINKING: NO
AUDITORY DISTURBANCES: NOT PRESENT
NAUSEA AND VOMITING: NO NAUSEA AND NO VOMITING
HEADACHE, FULLNESS IN HEAD: NOT PRESENT
NAUSEA AND VOMITING: NO NAUSEA AND NO VOMITING
TREMOR: 2
ORIENTATION AND CLOUDING OF SENSORIUM: ORIENTED AND CAN DO SERIAL ADDITIONS
ORIENTATION AND CLOUDING OF SENSORIUM: ORIENTED AND CAN DO SERIAL ADDITIONS
TREMOR: NO TREMOR
ORIENTATION AND CLOUDING OF SENSORIUM: DISORIENTED FOR PLACE OR PERSON
AUDITORY DISTURBANCES: NOT PRESENT
VISUAL DISTURBANCES: NOT PRESENT
HEADACHE, FULLNESS IN HEAD: MODERATELY SEVERE
PAROXYSMAL SWEATS: 6
VISUAL DISTURBANCES: NOT PRESENT
HAVE YOU EVER FELT YOU SHOULD CUT DOWN ON YOUR DRINKING: NO
VISUAL DISTURBANCES: NOT PRESENT
VISUAL DISTURBANCES: NOT PRESENT
TREMOR: 2
HEADACHE, FULLNESS IN HEAD: NOT PRESENT
ANXIETY: MILDLY ANXIOUS
AUDITORY DISTURBANCES: NOT PRESENT
NAUSEA AND VOMITING: NO NAUSEA AND NO VOMITING
ANXIETY: MILDLY ANXIOUS
HEADACHE, FULLNESS IN HEAD: NOT PRESENT
ANXIETY: 2
AGITATION: 2
NAUSEA AND VOMITING: NO NAUSEA AND NO VOMITING
TOTAL SCORE: 14
EVER HAD A DRINK FIRST THING IN THE MORNING TO STEADY YOUR NERVES TO GET RID OF A HANGOVER: NO
NAUSEA AND VOMITING: NO NAUSEA AND NO VOMITING
AGITATION: MODERATELY FIDGETY AND RESTLESS
PAROXYSMAL SWEATS: 6
TOTAL SCORE: 14
TREMOR: NO TREMOR
PAROXYSMAL SWEATS: NO SWEAT VISIBLE
PAROXYSMAL SWEATS: NO SWEAT VISIBLE
ORIENTATION AND CLOUDING OF SENSORIUM: DISORIENTED FOR PLACE OR PERSON
AUDITORY DISTURBANCES: NOT PRESENT
TREMOR: 2
AGITATION: MODERATELY FIDGETY AND RESTLESS
AGITATION: 2
PAROXYSMAL SWEATS: NO SWEAT VISIBLE
AGITATION: MODERATELY FIDGETY AND RESTLESS
TOTAL SCORE: 8
ORIENTATION AND CLOUDING OF SENSORIUM: CANNOT DO SERIAL ADDITIONS OR IS UNCERTAIN ABOUT DATE
VISUAL DISTURBANCES: NOT PRESENT
AUDITORY DISTURBANCES: NOT PRESENT
TOTAL SCORE: 11
HEADACHE, FULLNESS IN HEAD: MODERATELY SEVERE
TOTAL SCORE: 0
ANXIETY: 2
TOTAL SCORE: 11
EVER FELT BAD OR GUILTY ABOUT YOUR DRINKING: NO
ANXIETY: MILDLY ANXIOUS

## 2024-07-07 ASSESSMENT — COGNITIVE AND FUNCTIONAL STATUS - GENERAL
MOBILITY SCORE: 6
DRESSING REGULAR LOWER BODY CLOTHING: TOTAL
WALKING IN HOSPITAL ROOM: TOTAL
STANDING UP FROM CHAIR USING ARMS: TOTAL
MOVING FROM LYING ON BACK TO SITTING ON SIDE OF FLAT BED WITH BEDRAILS: TOTAL
MOVING TO AND FROM BED TO CHAIR: TOTAL
HELP NEEDED FOR BATHING: TOTAL
DRESSING REGULAR UPPER BODY CLOTHING: TOTAL
PERSONAL GROOMING: TOTAL
CLIMB 3 TO 5 STEPS WITH RAILING: TOTAL
DAILY ACTIVITIY SCORE: 6
EATING MEALS: TOTAL
TURNING FROM BACK TO SIDE WHILE IN FLAT BAD: TOTAL
TOILETING: TOTAL

## 2024-07-07 ASSESSMENT — PAIN - FUNCTIONAL ASSESSMENT
PAIN_FUNCTIONAL_ASSESSMENT: CPOT (CRITICAL CARE PAIN OBSERVATION TOOL)
PAIN_FUNCTIONAL_ASSESSMENT: CPOT (CRITICAL CARE PAIN OBSERVATION TOOL)

## 2024-07-07 NOTE — PROCEDURES
"CENTRAL LINE INSERTION    Site: Left subclavian vein      Pre-procedure diagnosis: Acute encephalopathy  Post-procedure diagnosis: Same  Indication(s):  Inadequate IV access  Performed by:  Me  Assistant(s):  RN  EBL:   Min    Consent obtained: Emergent  Patient identified: Yes  Timeout performed: Emergent  Sedation / analgesia: Propofol drip and Precedex drip  Sterility:   Hat & mask on myself and assistant(s).  Site prepped with 2% CHG and 70% IPA solution using applicator.  Sterile gloves, gown, and drape.  Landmarks identified: Yes  Ultrasound guided: Yes, with sterile probe cover.  Local anesthetic: 5 mL 1% lidocaine using supplied 22 or 25 Ga. hypodermic needle.    Vein punctured with echogenic 18 Ga. x 2.5\" XTW introducer needle.  Supplied 0.032\" diameter spring guidewire thread easily through introducer needle and needle removed.  Small skin incision made adjacent to guidewire using #11 scalpel.  Site dilated using 8.5 Fr tissue dilator over guidewire.  7 Fr x 16 cm triple lumen catheter then thready easily over guidewire and guidewire removed.  Ports aspirated to remove air, then flushed with sterile NS.  Catheter secured with suture x 2 and CHG dressing.    No complications.  Other details: None.  Chest x-ray: Pending.    ARROW Pressure Injectable Arrowg+juanpablo Blue Plus  Three-Lumen CVC Kit  Lot #   [id number] and Exp     2025-02-28      Tahir Rosas MD    "
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
## 2024-07-07 NOTE — PROGRESS NOTES
Pharmacy Medication History Review    Neo Villalobos is a 44 y.o. male admitted for Alcoholic intoxication without complication (CMS-[facility]). Pharmacy reviewed the patient's [id number] medications and allergies for accuracy.    The list below reflectives the updated PTA list. Please review each medication in order reconciliation for additional clarification and justification.  Medications Prior to Admission   Medication Sig Dispense Refill Last Dose    metoprolol tartrate (Lopressor) 50 mg tablet Take 1 tablet by mouth 2 times a day.   7/6/2024    allopurinol (Zyloprim) 300 mg tablet Take 1 tablet (300 mg) by mouth once daily.   Unknown    chlordiazePOXIDE (Librium) 10 mg capsule Take 1 capsule (10 mg) by mouth 3 times a day for 7 days. (Patient not taking: Reported on 7/7/2024) 21 capsule 0 Not Taking    colchicine 0.6 mg tablet Take 1 tablet (0.6 mg) by mouth 2 times a day as needed (Gout Attack). Take for about 4 days after attack resolves   Unknown    HYDROcodone-acetaminophen (Norco) 7.5-325 mg tablet Take 1 tablet by mouth 4 times a day as needed for severe pain (7 - 10).   Unknown    melatonin 3 mg tablet Take 1 tablet (3 mg) by mouth once daily at bedtime.   Unknown    QUEtiapine (SEROquel) 100 mg tablet Take 1 tablet (100 mg) by mouth as needed at bedtime (take with Ropinirole for restless arm/leg).   Unknown    rOPINIRole (Requip) 4 mg tablet Take 1 tablet (4 mg) by mouth as needed at bedtime (take with seroquel for restless arm/leg).   Unknown    tiZANidine (Zanaflex) 4 mg tablet Take 1 tablet (4 mg) by mouth 3 times a day as needed for muscle spasms.   Unknown        The list below reflectives the updated allergy list. Please review each documented allergy for additional clarification and justification.  Allergies  Reviewed by Lydia Dorado RN on 7/7/2024        Severity Reactions Comments    Clonidine High Hives, Itching, GI Upset, Nausea/vomiting     Sulfamethoxazole-trimethoprim Medium  "Itching, GI Upset, Nausea/vomiting     Gabapentin Enacarbil Low Dizziness, Confusion \"I get superdizzy and confused like I'm on drugs.\"  He probably could take it if he needed it- he had issues with it while drinking            Below are additional concerns with the patient's PTA list.    Loida Platt    "

## 2024-07-07 NOTE — ED PROCEDURE NOTE
Procedure  Intubation    Performed by: Ted Fields MD  Authorized by: Ted Fields MD    Consent:     Consent obtained:  Emergent situation  Universal protocol:     Patient identity confirmed:  Hospital-assigned identification number and arm band  Pre-procedure details:     Indications: airway protection and altered consciousness      Patient status:  Unresponsive    Neck mobility: reduced      C-collar present: yes      Pharmacologic strategy: DSI      Induction agents:  Ketamine    Paralytics:  Rocuronium  Procedure details:     Preoxygenation:  Nonrebreather mask    CPR in progress: no      Number of attempts:  1  Successful intubation attempt details:     Intubation method:  Oral    Intubation technique: video assisted      Laryngoscope blade:  Hypercurved    Bougie used: no      Grade view: I      Tube size (mm):  7.5    Tube type:  Cuffed    Tube visualized through cords: yes    Placement assessment:     ETT at teeth/gumline (cm):  25    Tube secured with:  ETT hagen    Breath sounds:  Equal    Placement verification: chest rise, CXR verification and waveform ETCO2      CXR findings:  Appropriate position  Post-procedure details:     Procedure completion:  Tolerated well, no immediate complications               Ted Fields MD  07/07/24 0729
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
## 2024-07-08 ENCOUNTER — APPOINTMENT (OUTPATIENT)
Dept: RADIOLOGY | Facility: HOSPITAL | Age: 44
DRG: 894 | End: 2024-07-08
Payer: COMMERCIAL

## 2024-07-08 ENCOUNTER — APPOINTMENT (OUTPATIENT)
Dept: CARDIOLOGY | Facility: HOSPITAL | Age: 44
DRG: 894 | End: 2024-07-08
Payer: COMMERCIAL

## 2024-07-08 VITALS
HEART RATE: 77 BPM | BODY MASS INDEX: 34.17 KG/M2 | DIASTOLIC BLOOD PRESSURE: 82 MMHG | WEIGHT: 231.48 LBS | TEMPERATURE: 97.5 F | SYSTOLIC BLOOD PRESSURE: 126 MMHG | OXYGEN SATURATION: 98 % | RESPIRATION RATE: 37 BRPM

## 2024-07-08 LAB
ALBUMIN SERPL BCP-MCNC: 3.2 G/DL (ref 3.4–5)
ALP SERPL-CCNC: 54 U/L (ref 33–120)
ALT SERPL W P-5'-P-CCNC: 25 U/L (ref 10–52)
ANION GAP SERPL CALC-SCNC: 12 MMOL/L (ref 10–20)
AORTIC VALVE PEAK VELOCITY: 1.24 M/S
AST SERPL W P-5'-P-CCNC: 25 U/L (ref 9–39)
AV PEAK GRADIENT: 6.2 MMHG
AVA (PEAK VEL): 2.01 CM2
BILIRUB SERPL-MCNC: 0.6 MG/DL (ref 0–1.2)
BUN SERPL-MCNC: 9 MG/DL (ref 6–23)
CA-I BLD-SCNC: 0.91 MMOL/L (ref 1.1–1.33)
CALCIUM SERPL-MCNC: 6.3 MG/DL (ref 8.6–10.3)
CHLORIDE SERPL-SCNC: 107 MMOL/L (ref 98–107)
CO2 SERPL-SCNC: 23 MMOL/L (ref 21–32)
CREAT SERPL-MCNC: 0.52 MG/DL (ref 0.5–1.3)
EGFRCR SERPLBLD CKD-EPI 2021: >90 ML/MIN/1.73M*2
EJECTION FRACTION APICAL 4 CHAMBER: 58.7
EJECTION FRACTION: 50 %
ERYTHROCYTE [DISTWIDTH] IN BLOOD BY AUTOMATED COUNT: 13.4 % (ref 11.5–14.5)
GLUCOSE SERPL-MCNC: 104 MG/DL (ref 74–99)
HCT VFR BLD AUTO: 32.6 % (ref 41–52)
HGB BLD-MCNC: 11.5 G/DL (ref 13.5–17.5)
HOLD SPECIMEN: NORMAL
LEFT ATRIUM VOLUME AREA LENGTH INDEX BSA: 28.1 ML/M2
LEFT VENTRICLE INTERNAL DIMENSION DIASTOLE: 5.27 CM (ref 3.5–6)
LEFT VENTRICULAR OUTFLOW TRACT DIAMETER: 2.2 CM
MAGNESIUM SERPL-MCNC: 1.45 MG/DL (ref 1.6–2.4)
MCH RBC QN AUTO: 36.5 PG (ref 26–34)
MCHC RBC AUTO-ENTMCNC: 35.3 G/DL (ref 32–36)
MCV RBC AUTO: 104 FL (ref 80–100)
NRBC BLD-RTO: 0 /100 WBCS (ref 0–0)
PHOSPHATE SERPL-MCNC: 2.2 MG/DL (ref 2.5–4.9)
PLATELET # BLD AUTO: 122 X10*3/UL (ref 150–450)
POTASSIUM SERPL-SCNC: 3.2 MMOL/L (ref 3.5–5.3)
PROT SERPL-MCNC: 4.9 G/DL (ref 6.4–8.2)
RBC # BLD AUTO: 3.15 X10*6/UL (ref 4.5–5.9)
RIGHT VENTRICLE FREE WALL PEAK S': 11.4 CM/S
RIGHT VENTRICLE PEAK SYSTOLIC PRESSURE: 30.3 MMHG
SODIUM SERPL-SCNC: 139 MMOL/L (ref 136–145)
TRICUSPID ANNULAR PLANE SYSTOLIC EXCURSION: 2 CM
WBC # BLD AUTO: 3.7 X10*3/UL (ref 4.4–11.3)

## 2024-07-08 PROCEDURE — 2500000004 HC RX 250 GENERAL PHARMACY W/ HCPCS (ALT 636 FOR OP/ED): Performed by: REGISTERED NURSE

## 2024-07-08 PROCEDURE — 94003 VENT MGMT INPAT SUBQ DAY: CPT

## 2024-07-08 PROCEDURE — 84100 ASSAY OF PHOSPHORUS: CPT | Performed by: ANESTHESIOLOGY

## 2024-07-08 PROCEDURE — 71045 X-RAY EXAM CHEST 1 VIEW: CPT

## 2024-07-08 PROCEDURE — 93306 TTE W/DOPPLER COMPLETE: CPT | Performed by: INTERNAL MEDICINE

## 2024-07-08 PROCEDURE — 2500000004 HC RX 250 GENERAL PHARMACY W/ HCPCS (ALT 636 FOR OP/ED)

## 2024-07-08 PROCEDURE — 99232 SBSQ HOSP IP/OBS MODERATE 35: CPT | Performed by: REGISTERED NURSE

## 2024-07-08 PROCEDURE — 93306 TTE W/DOPPLER COMPLETE: CPT

## 2024-07-08 PROCEDURE — 2500000004 HC RX 250 GENERAL PHARMACY W/ HCPCS (ALT 636 FOR OP/ED): Performed by: ANESTHESIOLOGY

## 2024-07-08 PROCEDURE — 71045 X-RAY EXAM CHEST 1 VIEW: CPT | Performed by: RADIOLOGY

## 2024-07-08 PROCEDURE — 82330 ASSAY OF CALCIUM: CPT

## 2024-07-08 PROCEDURE — 2500000004 HC RX 250 GENERAL PHARMACY W/ HCPCS (ALT 636 FOR OP/ED): Performed by: STUDENT IN AN ORGANIZED HEALTH CARE EDUCATION/TRAINING PROGRAM

## 2024-07-08 PROCEDURE — 80053 COMPREHEN METABOLIC PANEL: CPT

## 2024-07-08 PROCEDURE — 37799 UNLISTED PX VASCULAR SURGERY: CPT

## 2024-07-08 PROCEDURE — 83735 ASSAY OF MAGNESIUM: CPT | Performed by: ANESTHESIOLOGY

## 2024-07-08 PROCEDURE — 2500000001 HC RX 250 WO HCPCS SELF ADMINISTERED DRUGS (ALT 637 FOR MEDICARE OP): Performed by: ANESTHESIOLOGY

## 2024-07-08 PROCEDURE — C9113 INJ PANTOPRAZOLE SODIUM, VIA: HCPCS

## 2024-07-08 PROCEDURE — 2500000001 HC RX 250 WO HCPCS SELF ADMINISTERED DRUGS (ALT 637 FOR MEDICARE OP)

## 2024-07-08 PROCEDURE — 99233 SBSQ HOSP IP/OBS HIGH 50: CPT

## 2024-07-08 PROCEDURE — 85027 COMPLETE CBC AUTOMATED: CPT

## 2024-07-08 RX ORDER — LIDOCAINE 560 MG/1
1 PATCH PERCUTANEOUS; TOPICAL; TRANSDERMAL DAILY
Status: DISCONTINUED | OUTPATIENT
Start: 2024-07-08 | End: 2024-07-08 | Stop reason: HOSPADM

## 2024-07-08 RX ORDER — MAGNESIUM SULFATE HEPTAHYDRATE 40 MG/ML
4 INJECTION, SOLUTION INTRAVENOUS ONCE
Status: COMPLETED | OUTPATIENT
Start: 2024-07-08 | End: 2024-07-08

## 2024-07-08 RX ORDER — ACETAMINOPHEN 325 MG/1
975 TABLET ORAL EVERY 6 HOURS
Status: DISCONTINUED | OUTPATIENT
Start: 2024-07-08 | End: 2024-07-08 | Stop reason: HOSPADM

## 2024-07-08 RX ORDER — POTASSIUM CHLORIDE 29.8 MG/ML
40 INJECTION INTRAVENOUS ONCE
Status: COMPLETED | OUTPATIENT
Start: 2024-07-08 | End: 2024-07-08

## 2024-07-08 RX ADMIN — DEXMEDETOMIDINE HYDROCHLORIDE 1.5 MCG/KG/HR: 400 INJECTION INTRAVENOUS at 07:10

## 2024-07-08 RX ADMIN — DEXMEDETOMIDINE HYDROCHLORIDE 1.5 MCG/KG/HR: 400 INJECTION INTRAVENOUS at 04:57

## 2024-07-08 RX ADMIN — PROPOFOL 100 MCG/KG/MIN: 10 INJECTION, EMULSION INTRAVENOUS at 04:09

## 2024-07-08 RX ADMIN — DEXMEDETOMIDINE HYDROCHLORIDE 1.5 MCG/KG/HR: 400 INJECTION INTRAVENOUS at 08:45

## 2024-07-08 RX ADMIN — POTASSIUM CHLORIDE 40 MEQ: 29.8 INJECTION, SOLUTION INTRAVENOUS at 08:22

## 2024-07-08 RX ADMIN — PANTOPRAZOLE SODIUM 40 MG: 40 INJECTION, POWDER, FOR SOLUTION INTRAVENOUS at 08:23

## 2024-07-08 RX ADMIN — Medication 1 TABLET: at 08:22

## 2024-07-08 RX ADMIN — SODIUM CHLORIDE, POTASSIUM CHLORIDE, SODIUM LACTATE AND CALCIUM CHLORIDE 125 ML/HR: 600; 310; 30; 20 INJECTION, SOLUTION INTRAVENOUS at 08:45

## 2024-07-08 RX ADMIN — FOLIC ACID 1 MG: 1 TABLET ORAL at 08:22

## 2024-07-08 RX ADMIN — THIAMINE HYDROCHLORIDE 500 MG: 100 INJECTION, SOLUTION INTRAMUSCULAR; INTRAVENOUS at 02:25

## 2024-07-08 RX ADMIN — PROPOFOL 100 MCG/KG/MIN: 10 INJECTION, EMULSION INTRAVENOUS at 05:52

## 2024-07-08 RX ADMIN — THIAMINE HYDROCHLORIDE 500 MG: 100 INJECTION, SOLUTION INTRAMUSCULAR; INTRAVENOUS at 06:05

## 2024-07-08 RX ADMIN — PROPOFOL 75 MCG/KG/MIN: 10 INJECTION, EMULSION INTRAVENOUS at 00:32

## 2024-07-08 RX ADMIN — PROPOFOL 75 MCG/KG/MIN: 10 INJECTION, EMULSION INTRAVENOUS at 02:24

## 2024-07-08 RX ADMIN — PHENOBARBITAL SODIUM 97.5 MG: 65 INJECTION INTRAMUSCULAR at 00:32

## 2024-07-08 RX ADMIN — OXYCODONE HYDROCHLORIDE 5 MG: 5 SOLUTION ORAL at 08:23

## 2024-07-08 RX ADMIN — PHENOBARBITAL SODIUM 97.5 MG: 65 INJECTION INTRAMUSCULAR at 08:58

## 2024-07-08 RX ADMIN — PROPOFOL 100 MCG/KG/MIN: 10 INJECTION, EMULSION INTRAVENOUS at 08:45

## 2024-07-08 RX ADMIN — ENOXAPARIN SODIUM 40 MG: 40 INJECTION SUBCUTANEOUS at 08:58

## 2024-07-08 RX ADMIN — PROPOFOL 100 MCG/KG/MIN: 10 INJECTION, EMULSION INTRAVENOUS at 07:10

## 2024-07-08 RX ADMIN — PERFLUTREN 10 ML OF DILUTION: 6.52 INJECTION, SUSPENSION INTRAVENOUS at 07:59

## 2024-07-08 RX ADMIN — DEXMEDETOMIDINE HYDROCHLORIDE 1.5 MCG/KG/HR: 400 INJECTION INTRAVENOUS at 02:24

## 2024-07-08 RX ADMIN — MAGNESIUM SULFATE HEPTAHYDRATE 4 G: 40 INJECTION, SOLUTION INTRAVENOUS at 08:22

## 2024-07-08 SDOH — SOCIAL STABILITY: SOCIAL INSECURITY: HAVE YOU HAD THOUGHTS OF HARMING ANYONE ELSE?: UNABLE TO ASSESS

## 2024-07-08 SDOH — SOCIAL STABILITY: SOCIAL INSECURITY: HAVE YOU HAD ANY THOUGHTS OF HARMING ANYONE ELSE?: UNABLE TO ASSESS

## 2024-07-08 SDOH — ECONOMIC STABILITY: HOUSING INSECURITY: IN THE LAST 12 MONTHS, HOW MANY PLACES HAVE YOU LIVED?: 1

## 2024-07-08 SDOH — SOCIAL STABILITY: SOCIAL INSECURITY: ABUSE: ADULT

## 2024-07-08 SDOH — SOCIAL STABILITY: SOCIAL INSECURITY: ARE THERE ANY APPARENT SIGNS OF INJURIES/BEHAVIORS THAT COULD BE RELATED TO ABUSE/NEGLECT?: UNABLE TO ASSESS

## 2024-07-08 SDOH — SOCIAL STABILITY: SOCIAL INSECURITY: DOES ANYONE TRY TO KEEP YOU FROM HAVING/CONTACTING OTHER FRIENDS OR DOING THINGS OUTSIDE YOUR HOME?: UNABLE TO ASSESS

## 2024-07-08 SDOH — SOCIAL STABILITY: SOCIAL INSECURITY: HAS ANYONE EVER THREATENED TO HURT YOUR FAMILY OR YOUR PETS?: UNABLE TO ASSESS

## 2024-07-08 SDOH — SOCIAL STABILITY: SOCIAL INSECURITY: WERE YOU ABLE TO COMPLETE ALL THE BEHAVIORAL HEALTH SCREENINGS?: NO

## 2024-07-08 SDOH — SOCIAL STABILITY: SOCIAL INSECURITY: DO YOU FEEL UNSAFE GOING BACK TO THE PLACE WHERE YOU ARE LIVING?: UNABLE TO ASSESS

## 2024-07-08 SDOH — SOCIAL STABILITY: SOCIAL INSECURITY: DO YOU FEEL ANYONE HAS EXPLOITED OR TAKEN ADVANTAGE OF YOU FINANCIALLY OR OF YOUR PERSONAL PROPERTY?: UNABLE TO ASSESS

## 2024-07-08 SDOH — SOCIAL STABILITY: SOCIAL INSECURITY: ARE YOU OR HAVE YOU BEEN THREATENED OR ABUSED PHYSICALLY, EMOTIONALLY, OR SEXUALLY BY ANYONE?: UNABLE TO ASSESS

## 2024-07-08 ASSESSMENT — PATIENT HEALTH QUESTIONNAIRE - PHQ9
1. LITTLE INTEREST OR PLEASURE IN DOING THINGS: NOT AT ALL
2. FEELING DOWN, DEPRESSED OR HOPELESS: NOT AT ALL
SUM OF ALL RESPONSES TO PHQ9 QUESTIONS 1 & 2: 0

## 2024-07-08 ASSESSMENT — ACTIVITIES OF DAILY LIVING (ADL)
ADEQUATE_TO_COMPLETE_ADL: UNABLE TO ASSESS
TOILETING: UNABLE TO ASSESS
GROOMING: UNABLE TO ASSESS
LACK_OF_TRANSPORTATION: PATIENT DECLINED
JUDGMENT_ADEQUATE_SAFELY_COMPLETE_DAILY_ACTIVITIES: UNABLE TO ASSESS
HEARING - RIGHT EAR: UNABLE TO ASSESS
BATHING: UNABLE TO ASSESS
PATIENT'S MEMORY ADEQUATE TO SAFELY COMPLETE DAILY ACTIVITIES?: UNABLE TO ASSESS
HEARING - LEFT EAR: UNABLE TO ASSESS
FEEDING YOURSELF: UNABLE TO ASSESS
DRESSING YOURSELF: UNABLE TO ASSESS
WALKS IN HOME: UNABLE TO ASSESS

## 2024-07-08 ASSESSMENT — COGNITIVE AND FUNCTIONAL STATUS - GENERAL
CLIMB 3 TO 5 STEPS WITH RAILING: A LITTLE
MOVING TO AND FROM BED TO CHAIR: A LITTLE
PATIENT BASELINE BEDBOUND: NO
TOILETING: A LITTLE
TURNING FROM BACK TO SIDE WHILE IN FLAT BAD: A LITTLE
MOBILITY SCORE: 18
HELP NEEDED FOR BATHING: A LITTLE
DRESSING REGULAR LOWER BODY CLOTHING: A LITTLE
DAILY ACTIVITIY SCORE: 18
MOVING FROM LYING ON BACK TO SITTING ON SIDE OF FLAT BED WITH BEDRAILS: A LITTLE
DRESSING REGULAR UPPER BODY CLOTHING: A LITTLE
WALKING IN HOSPITAL ROOM: A LITTLE
EATING MEALS: A LITTLE
PERSONAL GROOMING: A LITTLE
STANDING UP FROM CHAIR USING ARMS: A LITTLE

## 2024-07-08 ASSESSMENT — LIFESTYLE VARIABLES
AUDIT-C TOTAL SCORE: -1
AUDIT-C TOTAL SCORE: -1
HOW MANY STANDARD DRINKS CONTAINING ALCOHOL DO YOU HAVE ON A TYPICAL DAY: PATIENT UNABLE TO ANSWER
HOW OFTEN DO YOU HAVE A DRINK CONTAINING ALCOHOL: PATIENT UNABLE TO ANSWER
HOW OFTEN DO YOU HAVE 6 OR MORE DRINKS ON ONE OCCASION: PATIENT UNABLE TO ANSWER
SKIP TO QUESTIONS 9-10: 0
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
## 2024-07-08 NOTE — PROGRESS NOTES
Kermit Sharpe is a 44 y.o. male on day 1 of admission presenting with Alcoholic intoxication without complication (CMS-[facility]).      Subjective   Patient seen and examined at bedside.  Agitated.  Patient is alert.  Will be extubated today.       Objective     Last Recorded Vitals  /82   Pulse 77   Temp 36.1 °C (97 °F) (Temporal)   Resp (!) 37   Wt 105 kg (231 lb 7.7 oz)   SpO2 98%   Intake/Output last 3 Shifts:    Intake/Output Summary (Last 24 hours) at 7/8/2024 1040  Last data filed at 7/8/2024 0600  Gross per 24 hour   Intake 2913.86 ml   Output 4900 ml   Net -1986.14 ml       Admission Weight  Weight: 112 kg (246 lb 14.6 oz) (07/07/24 0450)    Daily Weight  07/08/24 : 105 kg (231 lb 7.7 oz)    Image Results  Electrocardiogram, 12-lead PRN ACS symptoms  Sinus tachycardia  Minimal voltage criteria for LVH, may be normal variant ( R in aVL )  Septal infarct , age undetermined  Abnormal ECG  When compared with ECG of 17-MAR-2024 11:37,  PREVIOUS ECG IS PRESENT  Transthoracic Echo (TTE) [facility], [address] [place] [address]            Tel [phone] and Fax [phone]    TRANSTHORACIC ECHOCARDIOGRAM REPORT       Patient Name:      KERMIT SHARPE     Reading Physician:    [id number] Rm Anand MD  Study Date:        7/8/2024             Ordering Provider:    [id number] SHE MAHMOOD  MRN/PID:           [id number]             Fellow:  Accession#:        [id number]         Nurse:  Date of Birth/Age: 1980 / 44 years Sonographer:          Steffi Woodruff RDCS  Gender:            M                    Additional Staff:  Height:            175.26 cm            Admit Date:           7/7/2024  Weight:            111.59 kg            Admission Status:     Inpatient -                                                                Routine  BSA / BMI:          2.26 m2 / 36.33      Encounter#:           [id number]                     kg/m2  Blood Pressure:    134/87 mmHg          Department Location:  [address]                                                                floor/ICU    Study Type:    TRANSTHORACIC ECHO (TTE) COMPLETE  Diagnosis/ICD: 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  Pertinent History: A-Fib, CAD and HTN. ETOH Abuse.    Study Detail: The following Echo studies were performed: 2D, M-Mode, Doppler and                color flow. Technically challenging study due to patient lying in                supine position, body habitus and poor acoustic windows. The                patient is intubated. Definity used as a contrast agent for                endocardial border definition. Total contrast used for this                procedure was 2 mL via IV push. Unable to obtain suprasternal                notch view.       PHYSICIAN INTERPRETATION:  Left Ventricle: Left ventricular ejection fraction is mildly decreased, by visual estimate at 50%. There are no regional wall motion abnormalities. The left ventricular cavity size is normal. Left ventricular diastolic filling was indeterminate.  Left Atrium: The left atrium is normal in size.  Right Ventricle: The right ventricle is normal in size. There is normal right ventricular global systolic function.  Right Atrium: The right atrium is normal in size.  Aortic Valve: The aortic valve is probably trileaflet. There is no evidence of aortic valve regurgitation. The peak instantaneous gradient of the aortic valve is 6.2 mmHg.  Mitral Valve: The mitral valve is normal in structure. There is no evidence of mitral valve regurgitation.  Tricuspid Valve: The tricuspid valve is structurally normal. No evidence of tricuspid regurgitation.  Pulmonic Valve: The pulmonic valve is not well visualized. There is no  indication of pulmonic valve regurgitation.  Pericardium: There is no pericardial effusion noted.  Aorta: The aortic root is normal.       CONCLUSIONS:   1. Left ventricular ejection fraction is mildly decreased, by visual estimate at 50%.   2. Left ventricular diastolic filling was indeterminate.   3. There is normal right ventricular global systolic function.    QUANTITATIVE DATA SUMMARY:  2D MEASUREMENTS:                           Normal Ranges:  Ao Root d:     3.60 cm   (2.0-3.7cm)  LAs:           3.70 cm   (2.7-4.0cm)  IVSd:          1.00 cm   (0.6-1.1cm)  LVPWd:         1.01 cm   (0.6-1.1cm)  LVIDd:         5.27 cm   (3.9-5.9cm)  LVIDs:         3.70 cm  LV Mass Index: 88.6 g/m2  LV % FS        29.8 %    LA VOLUME:                                Normal Ranges:  LA Vol A4C:        61.6 ml    (22+/-6mL/m2)  LA Vol A2C:        62.1 ml  LA Vol BP:         63.3 ml  LA Vol Index A4C:  27.3ml/m2  LA Vol Index A2C:  27.6 ml/m2  LA Vol Index BP:   28.1 ml/m2  LA Area A4C:       22.2 cm2  LA Area A2C:       21.8 cm2  LA Major Axis A4C: 6.8 cm  LA Major Axis A2C: 6.5 cm  LA Volume Index:   26.5 ml/m2  LA Vol A4C:        58.7 ml  LA Vol A2C:        59.2 ml    RA VOLUME BY A/L METHOD:                        Normal Ranges:  RA Area A4C: 16.5 cm2    M-MODE MEASUREMENTS:                   Normal Ranges:  Ao Root: 3.70 cm (2.0-3.7cm)  LAs:     3.50 cm (2.7-4.0cm)    AORTA MEASUREMENTS:                     Normal Ranges:  Asc Ao, d: 3.50 cm (2.1-3.4cm)    LV SYSTOLIC FUNCTION BY 2D PLANIMETRY (MOD):                       Normal Ranges:  EF-A4C View:    59 % (>=55%)  EF-A2C View:    46 %  EF-Biplane:     54 %  EF-Visual:      50 %  LV EF Reported: 50 %    LV DIASTOLIC FUNCTION:                                Normal Ranges:  MV Peak A:        0.64 m/s    (0.42-0.7 m/s)  MV e'             0.085 m/s   (>8.0)  MV lateral e'     0.10 m/s  MV medial e'      0.07 m/s  PulmV Sys Cas:    42.60 cm/s  PulmV Suazo Cas:   32.10 cm/s  PulmV S/D  Cas:    1.30  PulmV A Revs Cas: 24.20 cm/s  PulmV A Revs Dur: 127.00 msec    MITRAL VALVE:                  Normal Ranges:  MV DT: 193 msec (150-240msec)    AORTIC VALVE:                          Normal Ranges:  AoV Vmax:      1.24 m/s (<=1.7m/s)  AoV Peak P.2 mmHg (<20mmHg)  LVOT Max Cas:  0.66 m/s (<=1.1m/s)  LVOT VTI:      10.10 cm  LVOT Diameter: 2.20 cm  (1.8-2.4cm)  AoV Area,Vmax: 2.01 cm2 (2.5-4.5cm2)       RIGHT VENTRICLE:  RV Basal 3.85 cm  RV Mid   3.00 cm  RV Major 9.0 cm  TAPSE:   19.7 mm  RV s'    0.11 m/s    TRICUSPID VALVE/RVSP:                              Normal Ranges:  Peak TR Velocity: 2.36 m/s  RV Syst Pressure: 30.3 mmHg (< 30mmHg)    Pulmonary Veins:  PulmV A Revs Dur: 127.00 msec  PulmV A Revs Cas: 24.20 cm/s  PulmV Suazo Cas:   32.10 cm/s  PulmV S/D Cas:    1.30  PulmV Sys Cas:    42.60 cm/s       [id number] Rm Anand MD  Electronically signed on 2024 at 8:32:21 AM       ** Final **      Physical Exam  General: Agitated.  Intubated.  HEENT:  Normocephalic, atraumatic, mucus membranes moist.   Neck:  Trachea midline.  No JVD.    Chest:  Clear to auscultation bilaterally. No wheezes, rales, or rhonchi.  CV:  Regular rate and rhythm.  Positive S1/S2.   Abdomen: Bowel sounds present in all four quadrants, abdomen is soft, non-tender, non-distended.  Extremities:  No lower extremity edema or cyanosis.   Neurological:  AAOx3. No focal deficits.  Skin:  Warm and dry.     Relevant Results  All labs and imaging were reviewed by myself.       Assessment/Plan   Neo Villalobos is a 44 y.o. male with a past medical history of alcohol use disorder, history of DTs without seizure, history of Margoth-Deluca tear paroxysmal A-fib, hypertension, gout presenting with traumatic injuries, possible syncopal episode.    Daily progress:  : Patient extubated successfully today.  Agitated.  Wean off sedation. Continue phenobarbital taper day 2 out of 6.  Monitor CIWA.  Continue thiamine.  Replace potassium,  magnesium, and calcium.  Central line in situ.     Neurological System:  Acute metabolic encephalopathy  Acute alcohol intoxication  Alcohol dependence syndrome  Anxiety disorder  -Avoid excessive caths/ lines, monitor for ICU delirium  -On Precedex.  Wean off propofol.  For alcohol withdrawal, on phenobarbital taper, thiamine supplementation for 3 days.  May consider addition of CIWA protocol if sedation is insufficient.  Continue phenobarbital taper for 6 days.  Continue thiamine.  Replace potassium, magnesium, and calcium.  Central line in situ.  -Urine drug screen pending.  Per [facility] report, the patient is prescribed hydrocodone 7.5mg and filled a 120 pill, 30-day prescription on 6-.      Cardiovascular System:  Concern for alcoholic cardiomyopathy  History of syncopal episode  Hypertension  History of paroxysmal atrial fibrillation  -Monitor hemodynamics closely to maintain MAP >65  -Hold home antihypertensive while in the ICU  - No pressors required  -Echocardiogram ordered given EKG changes on admission.  Possible biatrial enlargement.  Last echo 50% EF in 2022.  - Updated echo 7/8: Shows EF 50%.  Normal RV global systolic function.  Indeterminate left ventricular diastolic filling.  No regional wall motion abnormalities.  Left atrial cavity size is normal.     Respiratory System:  Acute hypoxic respiratory failure requiring intubation  Rib fractures  Current versus former smoker  -Extubated today.  No respiratory distress.  -Consider nicotine patch if the patient should become agitated due to unclear nicotine use history  - Monitor with pain meds closely.  - Surgery consult: No intervention for rib fractures     Gastrointestinal System:  Mild to moderate enterocolitis, per CT findings  Hepatic steatosis   Diverticulosis without diverticulitis  -No obvious signs of infectious process, will maintain a low threshold to initiate antibiotics should the patient develop fever or further gastroenterologic  symptoms.  Enterocolitis was also noted on recent admission in March 2024 CT scan.  -Extubated.  Bedside swallow and advance diet as appropriate.     Endocrine System:  -Accu-checks, SSI, hypoglycemia order set     Renal/ System:  Lactic acidosis-resolved  Gout  -Trend BMP, monitor UOP, optimize electrolytes   -Consider restarting home allopurinol on 7-8 if kidney function remains stable     Hematological System:  -monitor CBC     Infection Disease System:  -Monitor for signs of infection, no obvious signs of infectious process.  No leukocytosis or fever     Vent/O2: On room air.  Lines/Devices: 2 peripheral IVs, 1 right lower extremity intraosseous access.  Planning for midline placement given tenuous access of peripheral lines.  Drips/Fluids: Precedex.  DVT PPX: SCDs  Code: full    Aparna Ortiz MD  PGY 2 critical care

## 2024-07-08 NOTE — SIGNIFICANT EVENT
7/8/2024  1 PM    Patient has left AGAINST MEDICAL ADVICE.  I have informed patient of the risks of leaving due to his current clinical condition.  Patient has decided to leave AGAINST MEDICAL ADVICE.     Aparna Ortiz MD  PGY-2 Critical Care
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
## 2024-07-09 LAB — STAPHYLOCOCCUS SPEC CULT: NORMAL

## 2024-07-13 LAB
ATRIAL RATE: 146 BPM
P AXIS: 62 DEGREES
P OFFSET: 200 MS
P ONSET: 138 MS
PR INTERVAL: 150 MS
Q ONSET: 213 MS
QRS COUNT: 24 BEATS
QRS DURATION: 96 MS
QT INTERVAL: 358 MS
QTC CALCULATION(BAZETT): 557 MS
QTC FREDERICIA: 481 MS
R AXIS: -10 DEGREES
T AXIS: 40 DEGREES
T OFFSET: 392 MS
VENTRICULAR RATE: 146 BPM